# Patient Record
Sex: FEMALE | Race: WHITE | Employment: UNEMPLOYED | URBAN - METROPOLITAN AREA
[De-identification: names, ages, dates, MRNs, and addresses within clinical notes are randomized per-mention and may not be internally consistent; named-entity substitution may affect disease eponyms.]

---

## 2017-01-26 ENCOUNTER — ED HISTORICAL/CONVERTED ENCOUNTER (OUTPATIENT)
Dept: OTHER | Age: 27
End: 2017-01-26

## 2017-06-05 ENCOUNTER — ED HISTORICAL/CONVERTED ENCOUNTER (OUTPATIENT)
Dept: OTHER | Age: 27
End: 2017-06-05

## 2017-09-22 ENCOUNTER — OP HISTORICAL/CONVERTED ENCOUNTER (OUTPATIENT)
Dept: OTHER | Age: 27
End: 2017-09-22

## 2019-01-14 ENCOUNTER — ED HISTORICAL/CONVERTED ENCOUNTER (OUTPATIENT)
Dept: OTHER | Age: 29
End: 2019-01-14

## 2019-04-06 ENCOUNTER — ED HISTORICAL/CONVERTED ENCOUNTER (OUTPATIENT)
Dept: OTHER | Age: 29
End: 2019-04-06

## 2019-05-26 ENCOUNTER — ED HISTORICAL/CONVERTED ENCOUNTER (OUTPATIENT)
Dept: OTHER | Age: 29
End: 2019-05-26

## 2019-05-29 ENCOUNTER — OFFICE VISIT (OUTPATIENT)
Dept: SURGERY | Age: 29
End: 2019-05-29

## 2019-05-29 VITALS
BODY MASS INDEX: 23.64 KG/M2 | HEART RATE: 90 BPM | SYSTOLIC BLOOD PRESSURE: 106 MMHG | DIASTOLIC BLOOD PRESSURE: 67 MMHG | WEIGHT: 156 LBS | HEIGHT: 68 IN

## 2019-05-29 DIAGNOSIS — Z91.89 AT HIGH RISK FOR BREAST CANCER: ICD-10-CM

## 2019-05-29 DIAGNOSIS — N64.52 BILATERAL NIPPLE DISCHARGE: Primary | ICD-10-CM

## 2019-05-29 RX ORDER — ALPRAZOLAM 1 MG/1
TABLET ORAL
Refills: 1 | COMMUNITY
Start: 2019-05-02

## 2019-05-29 NOTE — LETTER
2019 10:26 AM 
 
Patient:  Paresh Nunez YOB: 1990 Date of Visit: 2019 Dear Dr. Anjali Adams: Thank you for referring Ms. Paresh Nunez to me for evaluation/treatment. Below are the relevant portions of my assessment and plan of care. HISTORY OF PRESENT ILLNESS Paresh Nunez is a 34 y.o. female. HPI 
NEW patient presents for consultation at the request of Dr. Herman Nieves for BILATERAL nipple discharge which is white or yellowish in color and has increased in amount since November, when she first noticed this. Initially she had just drops coming from both nipples, but now when she gets in the shower has drops pouring from the nipples. She also notices discharge when she has chills. If she presses on her breasts, the discharge will squirt out. Also has breast tenderness BILATERALLY, but this is not new. Has no nipple retraction or skin change. She does have nipple piercings BILATERALLY, but she has had these for 9 years, only removed when she was breastfeeding. Pt states discharge began before she started taking new medications. FH is significant for a paternal grandmother who had breast cancer initially in her 25s and then again in her 46s and  of this. A paternal aunt had breast cancer in her 45s. Per patient, had a normal mammogram in 2018.   
  
 
Past Medical History:  
Diagnosis Date  Bipolar disorder ([facility] [address]  Depression  PTSD (post-traumatic stress disorder) Past Surgical History:  
Procedure Laterality Date  HX PELVIC LAPAROSCOPY Endometriosis  HX TONSILLECTOMY Social History Socioeconomic History  Marital status: SINGLE Spouse name: Not on file  Number of children: Not on file  Years of education: Not on file  Highest education level: Not on file Occupational History  Not on file Social Needs  Financial resource strain: Not on file  Food insecurity: Worry: Not on file Inability: Not on file  Transportation needs:  
  Medical: Not on file Non-medical: Not on file Tobacco Use  Smoking status: Current Every Day Smoker Packs/day: 1.00 Types: Cigarettes  Smokeless tobacco: Never Used Substance and Sexual Activity  Alcohol use: Not Currently  Drug use: Not on file  Sexual activity: Not on file Lifestyle  Physical activity:  
  Days per week: Not on file Minutes per session: Not on file  Stress: Not on file Relationships  Social connections:  
  Talks on phone: Not on file Gets together: Not on file Attends Shinto service: Not on file Active member of club or organization: Not on file Attends meetings of clubs or organizations: Not on file Relationship status: Not on file  Intimate partner violence:  
  Fear of current or ex partner: Not on file Emotionally abused: Not on file Physically abused: Not on file Forced sexual activity: Not on file Other Topics Concern  Not on file Social History Narrative  Not on file Current Outpatient Medications on File Prior to Visit Medication Sig Dispense Refill  ALPRAZolam (XANAX) 1 mg tablet TK 1 T PO  TID PRN  1 No current facility-administered medications on file prior to visit. Allergies Allergen Reactions  Latex Hives  Codeine Rash  Demerol [Meperidine] Itching  Penicillins Hives OB History None Obstetric Comments Menarche 8, LMP 5/15/19, # of children 1, age of 4st delivery 25, Hysterectomy/oophorectomy No/No/, Breast bx No, history of breast feeding Yes, BCP No, Nuva Ring, Mirena, Hormone therapy No 
  
  
  
 
 
ROS Constitutional: Negative. HENT: Negative. Eyes: Negative. Respiratory: Negative. Cardiovascular: Negative. Gastrointestinal: Negative. Genitourinary: Negative. Musculoskeletal: Negative. Skin: Negative. Neurological: Negative. Endo/Heme/Allergies: Negative. Psychiatric/Behavioral: Positive for depression. Physical Exam  
Cardiovascular: Normal rate, regular rhythm and normal heart sounds. Pulmonary/Chest: Breath sounds normal. Right breast exhibits nipple discharge. Right breast exhibits no inverted nipple, no mass, no skin change and no tenderness. Left breast exhibits nipple discharge. Left breast exhibits no inverted nipple, no mass, no skin change and no tenderness. Breasts are symmetrical.  
Lymphadenopathy:  
     Right cervical: No superficial cervical, no deep cervical and no posterior cervical adenopathy present. Left cervical: No superficial cervical, no deep cervical and no posterior cervical adenopathy present. She has no axillary adenopathy. Right axillary: No pectoral and no lateral adenopathy present. Left axillary: No pectoral and no lateral adenopathy present. ASSESSMENT and PLAN 
  ICD-10-CM ICD-9-CM 1. Bilateral nipple discharge N64.52 611.79 PROLACTIN New patient presents for evaluation of BL breast pain and lactation, and is doing well overall. BL multiorificial physiologic discharge. Ordered testing for prolactin levels. Advised pt to avoid checking for discharge via expression. Possible that nipple piercings may be perpetuating discharge even when pt is not actively checking for it. Discussed family history. Using the [address], calculated pt's lifetime risk at 23.6% for breast cancer. This qualifies her for enrollment in our high risk clinic that includes annual screening breast imaging and follow-up appointments with our nurse practitioner. Will f/u with prolactin levels once results are available. If normal, will order breast MRI for further evaluation. This plan was reviewed with the patient and patient agrees. All questions were answered.  
 
Written by Mike Duran, as dictated by Dr. Claudeen Kanner, MD. 
 
 
 If you have questions, please do not hesitate to call me. I look forward to following Ms. Andry Wylie along with you.  
 
 
 
Sincerely, 
 
 
Marjorie Mazariegos MD

## 2019-05-29 NOTE — PROGRESS NOTES
HISTORY OF PRESENT ILLNESS Filiberto Pastrana is a 34 y.o. female. HPI   NEW patient presents for consultation at the request of Dr. Josse Maldonado for BILATERAL nipple discharge which is white in color and has increased in amount since November, when she first noticed this. Initially she had just drops coming from both nipples, but now when she gets in the shower has drops pouring from the nipples. If she presses on her breasts, the discharge will squirt out. Also has breast tenderness BILATERALLY, but this is not new. Has no nipple retraction or skin change. She does have nipple piercings BILATERALLY, but she has had these for 9 years, only removed when she was breastfeeding. FH is significant for a paternal grandmother who had breast cancer initially in her 25s and then again in her 46s and  of this. A paternal aunt had breast cancer in her 45s. Per patient, had a normal mammogram in 2018. Review of Systems Constitutional: Negative. HENT: Negative. Eyes: Negative. Respiratory: Negative. Cardiovascular: Negative. Gastrointestinal: Negative. Genitourinary: Negative. Musculoskeletal: Negative. Skin: Negative. Neurological: Negative. Endo/Heme/Allergies: Negative. Psychiatric/Behavioral: Positive for depression. Physical Exam 
 
ASSESSMENT and PLAN 
{ASSESSMENT/PLAN:[id number]}

## 2019-05-29 NOTE — PATIENT INSTRUCTIONS

## 2019-05-29 NOTE — PROGRESS NOTES
HISTORY OF PRESENT ILLNESS  Marlee Wiseman is a 34 y.o. female. HPI  NEW patient presents for consultation at the request of Dr. fEren Rodriguez for BILATERAL nipple discharge which is white or yellowish in color and has increased in amount since November, when she first noticed this. Initially she had just drops coming from both nipples, but now when she gets in the shower has drops pouring from the nipples. She also notices discharge when she has chills. If she presses on her breasts, the discharge will squirt out. Also has breast tenderness BILATERALLY, but this is not new. Has no nipple retraction or skin change. She does have nipple piercings BILATERALLY, but she has had these for 9 years, only removed when she was breastfeeding. Pt states discharge began before she started taking new medications. FH is significant for a paternal grandmother who had breast cancer initially in her 25s and then again in her 46s and  of this. A paternal aunt had breast cancer in her 45s.      Per patient, had a normal mammogram in 2018.         Past Medical History:   Diagnosis Date    Bipolar disorder ([address]     Depression     PTSD (post-traumatic stress disorder)        Past Surgical History:   Procedure Laterality Date    HX PELVIC LAPAROSCOPY      Endometriosis    HX TONSILLECTOMY         Social History     Socioeconomic History    Marital status: SINGLE     Spouse name: Not on file    Number of children: Not on file    Years of education: Not on file    Highest education level: Not on file   Occupational History    Not on file   Social Needs    Financial resource strain: Not on file    Food insecurity:     Worry: Not on file     Inability: Not on file    Transportation needs:     Medical: Not on file     Non-medical: Not on file   Tobacco Use    Smoking status: Current Every Day Smoker     Packs/day: 1.00     Types: Cigarettes    Smokeless tobacco: Never Used   Substance and Sexual Activity    Alcohol use: Not Currently    Drug use: Not on file    Sexual activity: Not on file   Lifestyle    Physical activity:     Days per week: Not on file     Minutes per session: Not on file    Stress: Not on file   Relationships    Social connections:     Talks on phone: Not on file     Gets together: Not on file     Attends [employer] service: Not on file     Active member of club or organization: Not on file     Attends meetings of clubs or organizations: Not on file     Relationship status: Not on file    Intimate partner violence:     Fear of current or ex partner: Not on file     Emotionally abused: Not on file     Physically abused: Not on file     Forced sexual activity: Not on file   Other Topics Concern    Not on file   Social History Narrative    Not on file       Current Outpatient Medications on File Prior to Visit   Medication Sig Dispense Refill    ALPRAZolam (XANAX) 1 mg tablet TK 1 T PO  TID PRN  1     No current facility-administered medications on file prior to visit. Allergies   Allergen Reactions    Latex Hives    Codeine Rash    Demerol [Meperidine] Itching    Penicillins Hives       OB History    None      Obstetric Comments   Menarche 8, LMP 5/15/19, # of children 1, age of 6st delivery 25, Hysterectomy/oophorectomy No/No/, Breast bx No, history of breast feeding Yes, BCP No, Nuva Ring, Mirena, Hormone therapy No               ROS  Constitutional: Negative. HENT: Negative. Eyes: Negative. Respiratory: Negative. Cardiovascular: Negative. Gastrointestinal: Negative. Genitourinary: Negative. Musculoskeletal: Negative. Skin: Negative. Neurological: Negative. Endo/Heme/Allergies: Negative. Psychiatric/Behavioral: Positive for depression. Physical Exam   Cardiovascular: Normal rate, regular rhythm and normal heart sounds. Pulmonary/Chest: Breath sounds normal. Right breast exhibits nipple discharge.  Right breast exhibits no inverted nipple, no mass, no skin change and no tenderness. Left breast exhibits nipple discharge. Left breast exhibits no inverted nipple, no mass, no skin change and no tenderness. Breasts are symmetrical.   Lymphadenopathy:        Right cervical: No superficial cervical, no deep cervical and no posterior cervical adenopathy present. Left cervical: No superficial cervical, no deep cervical and no posterior cervical adenopathy present. She has no axillary adenopathy. Right axillary: No pectoral and no lateral adenopathy present. Left axillary: No pectoral and no lateral adenopathy present. ASSESSMENT and PLAN    ICD-10-CM ICD-9-CM    1. Bilateral nipple discharge N64.52 611.79 PROLACTIN      New patient presents for evaluation of BL breast pain and lactation, and is doing well overall. BL multiorificial physiologic discharge. Ordered testing for prolactin levels. Advised pt to avoid checking for discharge via expression. Possible that nipple piercings may be perpetuating discharge even when pt is not actively checking for it. Discussed family history. Using the [address], calculated pt's lifetime risk at 23.6% for breast cancer. This qualifies her for enrollment in our high risk clinic that includes annual screening breast imaging and follow-up appointments with our nurse practitioner. Will f/u with prolactin levels once results are available. If normal, will order breast MRI for further evaluation. This plan was reviewed with the patient and patient agrees. All questions were answered.     Written by Benito Arroyo, as dictated by Dr. Suresh Vaughn MD.

## 2019-05-30 ENCOUNTER — TELEPHONE (OUTPATIENT)
Dept: SURGERY | Age: 29
End: 2019-05-30

## 2019-05-30 LAB — PROLACTIN SERPL-MCNC: 17.4 NG/ML (ref 4.8–23.3)

## 2019-05-30 NOTE — COMMUNICATION BODY
HISTORY OF PRESENT ILLNESS  Jasmin Sorenson is a 34 y.o. female. HPI  NEW patient presents for consultation at the request of Dr. Ronni Barakat for BILATERAL nipple discharge which is white or yellowish in color and has increased in amount since November, when she first noticed this. Initially she had just drops coming from both nipples, but now when she gets in the shower has drops pouring from the nipples. She also notices discharge when she has chills. If she presses on her breasts, the discharge will squirt out. Also has breast tenderness BILATERALLY, but this is not new. Has no nipple retraction or skin change. She does have nipple piercings BILATERALLY, but she has had these for 9 years, only removed when she was breastfeeding. Pt states discharge began before she started taking new medications. FH is significant for a paternal grandmother who had breast cancer initially in her 25s and then again in her 46s and  of this. A paternal aunt had breast cancer in her 45s.      Per patient, had a normal mammogram in 2018.         Past Medical History:   Diagnosis Date    Bipolar disorder ([address]     Depression     PTSD (post-traumatic stress disorder)        Past Surgical History:   Procedure Laterality Date    HX PELVIC LAPAROSCOPY      Endometriosis    HX TONSILLECTOMY         Social History     Socioeconomic History    Marital status: SINGLE     Spouse name: Not on file    Number of children: Not on file    Years of education: Not on file    Highest education level: Not on file   Occupational History    Not on file   Social Needs    Financial resource strain: Not on file    Food insecurity:     Worry: Not on file     Inability: Not on file    Transportation needs:     Medical: Not on file     Non-medical: Not on file   Tobacco Use    Smoking status: Current Every Day Smoker     Packs/day: 1.00     Types: Cigarettes    Smokeless tobacco: Never Used   Substance and Sexual Activity    Alcohol use: Not Currently    Drug use: Not on file    Sexual activity: Not on file   Lifestyle    Physical activity:     Days per week: Not on file     Minutes per session: Not on file    Stress: Not on file   Relationships    Social connections:     Talks on phone: Not on file     Gets together: Not on file     Attends Anabaptism service: Not on file     Active member of club or organization: Not on file     Attends meetings of clubs or organizations: Not on file     Relationship status: Not on file    Intimate partner violence:     Fear of current or ex partner: Not on file     Emotionally abused: Not on file     Physically abused: Not on file     Forced sexual activity: Not on file   Other Topics Concern    Not on file   Social History Narrative    Not on file       Current Outpatient Medications on File Prior to Visit   Medication Sig Dispense Refill    ALPRAZolam (XANAX) 1 mg tablet TK 1 T PO  TID PRN  1     No current facility-administered medications on file prior to visit. Allergies   Allergen Reactions    Latex Hives    Codeine Rash    Demerol [Meperidine] Itching    Penicillins Hives       OB History    None      Obstetric Comments   Menarche 8, LMP 5/15/19, # of children 1, age of 6st delivery 25, Hysterectomy/oophorectomy No/No/, Breast bx No, history of breast feeding Yes, BCP No, Nuva Ring, Mirena, Hormone therapy No               ROS  Constitutional: Negative. HENT: Negative. Eyes: Negative. Respiratory: Negative. Cardiovascular: Negative. Gastrointestinal: Negative. Genitourinary: Negative. Musculoskeletal: Negative. Skin: Negative. Neurological: Negative. Endo/Heme/Allergies: Negative. Psychiatric/Behavioral: Positive for depression. Physical Exam   Cardiovascular: Normal rate, regular rhythm and normal heart sounds. Pulmonary/Chest: Breath sounds normal. Right breast exhibits nipple discharge.  Right breast exhibits no inverted nipple, no mass, no skin change and no tenderness. Left breast exhibits nipple discharge. Left breast exhibits no inverted nipple, no mass, no skin change and no tenderness. Breasts are symmetrical.   Lymphadenopathy:        Right cervical: No superficial cervical, no deep cervical and no posterior cervical adenopathy present. Left cervical: No superficial cervical, no deep cervical and no posterior cervical adenopathy present. She has no axillary adenopathy. Right axillary: No pectoral and no lateral adenopathy present. Left axillary: No pectoral and no lateral adenopathy present. ASSESSMENT and PLAN    ICD-10-CM ICD-9-CM    1. Bilateral nipple discharge N64.52 611.79 PROLACTIN      New patient presents for evaluation of BL breast pain and lactation, and is doing well overall. BL multiorificial physiologic discharge. Ordered testing for prolactin levels. Advised pt to avoid checking for discharge via expression. Possible that nipple piercings may be perpetuating discharge even when pt is not actively checking for it. Discussed family history. Using the [address], calculated pt's lifetime risk at 23.6% for breast cancer. This qualifies her for enrollment in our high risk clinic that includes annual screening breast imaging and follow-up appointments with our nurse practitioner. Will f/u with prolactin levels once results are available. If normal, will order breast MRI for further evaluation. This plan was reviewed with the patient and patient agrees. All questions were answered.     Written by Lety Duran, as dictated by Dr. Madison Cardona MD.

## 2019-09-20 ENCOUNTER — DOCUMENTATION ONLY (OUTPATIENT)
Dept: SURGERY | Age: 29
End: 2019-09-20

## 2019-10-05 ENCOUNTER — ED HISTORICAL/CONVERTED ENCOUNTER (OUTPATIENT)
Dept: OTHER | Age: 29
End: 2019-10-05

## 2020-03-10 ENCOUNTER — ED HISTORICAL/CONVERTED ENCOUNTER (OUTPATIENT)
Dept: OTHER | Age: 30
End: 2020-03-10

## 2020-05-22 ENCOUNTER — HOSPITAL ENCOUNTER (OUTPATIENT)
Dept: MRI IMAGING | Age: 30
Discharge: HOME | End: 2020-05-22
Attending: FAMILY MEDICINE
Payer: MEDICAID

## 2020-05-22 DIAGNOSIS — M23.304 MENISCUS, MEDIAL, DERANGEMENT, LEFT: ICD-10-CM

## 2020-05-22 DIAGNOSIS — S83.412A SPRAIN OF MEDIAL COLLATERAL LIGAMENT OF LEFT KNEE: ICD-10-CM

## 2020-05-22 PROCEDURE — 73721 MRI JNT OF LWR EXTRE W/O DYE: CPT

## 2020-11-04 ENCOUNTER — APPOINTMENT (OUTPATIENT)
Dept: CT IMAGING | Age: 30
End: 2020-11-04
Attending: EMERGENCY MEDICINE
Payer: MEDICAID

## 2020-11-04 ENCOUNTER — APPOINTMENT (OUTPATIENT)
Dept: GENERAL RADIOLOGY | Age: 30
End: 2020-11-04
Attending: EMERGENCY MEDICINE
Payer: MEDICAID

## 2020-11-04 PROCEDURE — 70450 CT HEAD/BRAIN W/O DYE: CPT

## 2020-11-04 PROCEDURE — 99283 EMERGENCY DEPT VISIT LOW MDM: CPT

## 2020-11-04 PROCEDURE — 85025 COMPLETE CBC W/AUTO DIFF WBC: CPT

## 2020-11-04 PROCEDURE — 84484 ASSAY OF TROPONIN QUANT: CPT

## 2020-11-04 PROCEDURE — 96374 THER/PROPH/DIAG INJ IV PUSH: CPT

## 2020-11-04 PROCEDURE — 71045 X-RAY EXAM CHEST 1 VIEW: CPT

## 2020-11-04 PROCEDURE — 82550 ASSAY OF CK (CPK): CPT

## 2020-11-04 PROCEDURE — 93005 ELECTROCARDIOGRAM TRACING: CPT

## 2020-11-04 PROCEDURE — 96375 TX/PRO/DX INJ NEW DRUG ADDON: CPT

## 2020-11-05 ENCOUNTER — HOSPITAL ENCOUNTER (EMERGENCY)
Age: 30
Discharge: HOME | End: 2020-11-05
Payer: MEDICAID

## 2020-11-05 VITALS
OXYGEN SATURATION: 98 % | HEART RATE: 66 BPM | WEIGHT: 158 LBS | HEIGHT: 68 IN | RESPIRATION RATE: 16 BRPM | TEMPERATURE: 97.9 F | BODY MASS INDEX: 23.95 KG/M2 | SYSTOLIC BLOOD PRESSURE: 100 MMHG | DIASTOLIC BLOOD PRESSURE: 64 MMHG

## 2020-11-05 DIAGNOSIS — R42 DIZZINESS: Primary | ICD-10-CM

## 2020-11-05 DIAGNOSIS — G44.221 CHRONIC TENSION-TYPE HEADACHE, INTRACTABLE: ICD-10-CM

## 2020-11-05 LAB
ALBUMIN SERPL-MCNC: 3.7 G/DL (ref 3.5–5)
ALBUMIN/GLOB SERPL: 1 {RATIO} (ref 1.1–2.2)
ALP SERPL-CCNC: 57 U/L (ref 45–117)
ALT SERPL-CCNC: 15 U/L (ref 12–78)
ALT SERPL-CCNC: ABNORMAL U/L (ref 12–78)
ANION GAP SERPL CALC-SCNC: 1 MMOL/L (ref 5–15)
AST SERPL W P-5'-P-CCNC: 9 U/L (ref 15–37)
AST SERPL W P-5'-P-CCNC: ABNORMAL U/L (ref 15–37)
ATRIAL RATE: 80 BPM
BASOPHILS # BLD: 0 K/UL (ref 0–0.1)
BASOPHILS NFR BLD: 0 % (ref 0–1)
BILIRUB SERPL-MCNC: 0.3 MG/DL (ref 0.2–1)
BUN SERPL-MCNC: 13 MG/DL (ref 6–20)
BUN/CREAT SERPL: 14 (ref 12–20)
CA-I BLD-MCNC: 8.5 MG/DL (ref 8.5–10.1)
CALCULATED P AXIS, ECG09: 81 DEGREES
CALCULATED R AXIS, ECG10: 76 DEGREES
CALCULATED T AXIS, ECG11: 55 DEGREES
CHLORIDE SERPL-SCNC: 108 MMOL/L (ref 97–108)
CK SERPL-CCNC: 80 NG/ML (ref 26–192)
CO2 SERPL-SCNC: 28 MMOL/L (ref 21–32)
CREAT SERPL-MCNC: 0.93 MG/DL (ref 0.55–1.02)
DIAGNOSIS, 93000: NORMAL
DIFFERENTIAL METHOD BLD: NORMAL
EOSINOPHIL # BLD: 0.3 K/UL (ref 0–0.4)
EOSINOPHIL NFR BLD: 4 % (ref 0–7)
ERYTHROCYTE [DISTWIDTH] IN BLOOD BY AUTOMATED COUNT: 14 % (ref 11.5–14.5)
GLOBULIN SER CALC-MCNC: 3.8 G/DL (ref 2–4)
GLUCOSE SERPL-MCNC: 76 MG/DL (ref 65–100)
HCT VFR BLD AUTO: 41.9 % (ref 35–47)
HGB BLD-MCNC: 13.6 G/DL (ref 11.5–16)
IMM GRANULOCYTES # BLD AUTO: 0 K/UL (ref 0–0.04)
IMM GRANULOCYTES NFR BLD AUTO: 0 % (ref 0–0.5)
LYMPHOCYTES # BLD: 2.6 K/UL (ref 0.8–3.5)
LYMPHOCYTES NFR BLD: 38 % (ref 12–49)
MCH RBC QN AUTO: 29 PG (ref 26–34)
MCHC RBC AUTO-ENTMCNC: 32.5 G/DL (ref 30–36.5)
MCV RBC AUTO: 89.3 FL (ref 80–99)
MONOCYTES # BLD: 0.6 K/UL (ref 0–1)
MONOCYTES NFR BLD: 9 % (ref 5–13)
NEUTS SEG # BLD: 3.4 K/UL (ref 1.8–8)
NEUTS SEG NFR BLD: 49 % (ref 32–75)
P-R INTERVAL, ECG05: 138 MS
PLATELET # BLD AUTO: 226 K/UL (ref 150–400)
PMV BLD AUTO: 11.5 FL (ref 8.9–12.9)
POTASSIUM SERPL-SCNC: 3.6 MMOL/L (ref 3.5–5.1)
POTASSIUM SERPL-SCNC: ABNORMAL MMOL/L (ref 3.5–5.1)
PROT SERPL-MCNC: 7.5 G/DL (ref 6.4–8.2)
Q-T INTERVAL, ECG07: 396 MS
QRS DURATION, ECG06: 82 MS
QTC CALCULATION (BEZET), ECG08: 456 MS
RBC # BLD AUTO: 4.69 M/UL (ref 3.8–5.2)
SODIUM SERPL-SCNC: 137 MMOL/L (ref 136–145)
TROPONIN I SERPL-MCNC: <0.05 NG/ML
VENTRICULAR RATE, ECG03: 80 BPM
WBC # BLD AUTO: 6.9 K/UL (ref 3.6–11)

## 2020-11-05 PROCEDURE — 36415 COLL VENOUS BLD VENIPUNCTURE: CPT

## 2020-11-05 PROCEDURE — 96375 TX/PRO/DX INJ NEW DRUG ADDON: CPT

## 2020-11-05 PROCEDURE — 96374 THER/PROPH/DIAG INJ IV PUSH: CPT

## 2020-11-05 PROCEDURE — 84450 TRANSFERASE (AST) (SGOT): CPT

## 2020-11-05 PROCEDURE — 84460 ALANINE AMINO (ALT) (SGPT): CPT

## 2020-11-05 PROCEDURE — 74011250636 HC RX REV CODE- 250/636: Performed by: NURSE PRACTITIONER

## 2020-11-05 PROCEDURE — 84132 ASSAY OF SERUM POTASSIUM: CPT

## 2020-11-05 RX ORDER — BUTALBITAL, ACETAMINOPHEN AND CAFFEINE 300; 40; 50 MG/1; MG/1; MG/1
1 CAPSULE ORAL
Qty: 10 CAP | Refills: 0 | Status: SHIPPED | OUTPATIENT
Start: 2020-11-05

## 2020-11-05 RX ORDER — PROCHLORPERAZINE EDISYLATE 5 MG/ML
10 INJECTION INTRAMUSCULAR; INTRAVENOUS
Status: COMPLETED | OUTPATIENT
Start: 2020-11-05 | End: 2020-11-05

## 2020-11-05 RX ORDER — DIPHENHYDRAMINE HYDROCHLORIDE 50 MG/ML
25 INJECTION, SOLUTION INTRAMUSCULAR; INTRAVENOUS ONCE
Status: COMPLETED | OUTPATIENT
Start: 2020-11-05 | End: 2020-11-05

## 2020-11-05 RX ADMIN — DIPHENHYDRAMINE HYDROCHLORIDE 25 MG: 50 INJECTION, SOLUTION INTRAMUSCULAR; INTRAVENOUS at 01:08

## 2020-11-05 RX ADMIN — SODIUM CHLORIDE 1000 ML: 9 INJECTION, SOLUTION INTRAVENOUS at 01:07

## 2020-11-05 RX ADMIN — PROCHLORPERAZINE EDISYLATE 10 MG: 5 INJECTION INTRAMUSCULAR; INTRAVENOUS at 01:09

## 2020-11-05 NOTE — ED TRIAGE NOTES
States the last 5 months she has had headaches and dizziness. States it is getting worse. Has not seen pcp.  Also has a lump in the right armpit

## 2020-11-05 NOTE — ED PROVIDER NOTES
EMERGENCY DEPARTMENT HISTORY AND PHYSICAL EXAM      Date: 11/5/2020  Patient Name: Pricilla Galarza      History of Presenting Illness     Chief Complaint   Patient presents with    Dizziness    Headache    Fatigue       History Provided By: Patient    HPI: Pricilla Galarza, 31 y.o. female with a past medical history significant environmental allergies presents to the ED with cc of dizziness and headache every day for the last 5 months. She states she has been unable to see her PCP secondary to Covid closure. She reports some associated photophobia and nausea. Symptoms are worse with any movement. She denies any head trauma, vomiting, fever/chills or visual changes. Last vision exam was earlier this year. SHe has tried taking Tylenol, IBU with no relief. She denies any headache or dizziness presently. There are no other complaints, changes, or physical findings at this time. PCP: Lianet Church MD    Current Outpatient Medications   Medication Sig Dispense Refill    butalbital-acetaminophen-caff (FIORICET) -40 mg per capsule Take 1 Cap by mouth every four (4) hours as needed for Headache. 10 Cap 0    ALPRAZolam (XANAX) 1 mg tablet TK 1 T PO  TID PRN  1       Past History     Past Medical History:  Past Medical History:   Diagnosis Date    Bipolar disorder ([address]     Depression     PTSD (post-traumatic stress disorder)        Past Surgical History:  Past Surgical History:   Procedure Laterality Date    HX PELVIC LAPAROSCOPY      Endometriosis    HX TONSILLECTOMY         Family History:  History reviewed. No pertinent family history. Social History:  Social History     Tobacco Use    Smoking status: Current Every Day Smoker     Packs/day: 1.00     Types: Cigarettes    Smokeless tobacco: Never Used   Substance Use Topics    Alcohol use: Not Currently    Drug use: Never       Allergies:   Allergies   Allergen Reactions    Latex Hives    Codeine Rash    Demerol [Meperidine] Itching    Penicillins Hives         Review of Systems     Review of Systems   Constitutional: Negative. Negative for chills, fatigue and fever. Eyes: Positive for photophobia. Negative for visual disturbance. Respiratory: Negative. Negative for cough and shortness of breath. Cardiovascular: Negative. Negative for chest pain and palpitations. Gastrointestinal: Positive for nausea. Negative for abdominal pain, diarrhea and vomiting. Genitourinary: Negative. Negative for dysuria and hematuria. Neurological: Positive for dizziness, light-headedness and headaches. Negative for speech difficulty and numbness. All other systems reviewed and are negative. Physical Exam     Physical Exam  Vitals signs and nursing note reviewed. Constitutional:       General: She is not in acute distress. Appearance: Normal appearance. HENT:      Head: Normocephalic and atraumatic. Right Ear: Tympanic membrane normal.      Left Ear: Tympanic membrane normal.      Nose: Rhinorrhea present. Right Turbinates: Swollen and pale. Left Turbinates: Swollen and pale. Right Sinus: Frontal sinus tenderness present. Left Sinus: Frontal sinus tenderness present. Mouth/Throat:      Lips: Pink. Mouth: Mucous membranes are moist.      Pharynx: Oropharynx is clear. Uvula midline. Tonsils: No tonsillar exudate. Eyes:      Extraocular Movements: Extraocular movements intact. Right eye: No nystagmus. Left eye: No nystagmus. Conjunctiva/sclera: Conjunctivae normal.      Pupils: Pupils are equal, round, and reactive to light. Neck:      Musculoskeletal: Normal range of motion and neck supple. Cardiovascular:      Rate and Rhythm: Normal rate and regular rhythm. Pulses: Normal pulses. Heart sounds: Normal heart sounds. Pulmonary:      Effort: Pulmonary effort is normal.      Breath sounds: Normal breath sounds and air entry. No wheezing or rales.    Abdominal: General: Bowel sounds are normal.      Palpations: Abdomen is soft. Tenderness: There is no abdominal tenderness. Musculoskeletal: Normal range of motion. Skin:     General: Skin is warm and dry. Neurological:      General: No focal deficit present. Mental Status: She is alert. GCS: GCS eye subscore is 4. GCS verbal subscore is 5. GCS motor subscore is 6. Cranial Nerves: Cranial nerves are intact. Sensory: Sensation is intact. Motor: Motor function is intact. Coordination: Coordination is intact. Gait: Gait is intact. Psychiatric:         Mood and Affect: Mood normal.         Behavior: Behavior normal. Behavior is cooperative. Lab and Diagnostic Study Results     Labs -  Admission on 11/05/2020, Discharged on 11/05/2020   Component Date Value    Ventricular Rate 11/04/2020 80     Atrial Rate 11/04/2020 80     P-R Interval 11/04/2020 138     QRS Duration 11/04/2020 82     Q-T Interval 11/04/2020 396     QTC Calculation (Bezet) 11/04/2020 456     Calculated P Axis 11/04/2020 81     Calculated R Axis 11/04/2020 76     Calculated T Axis 11/04/2020 55     Diagnosis 11/04/2020                      Value:Normal sinus rhythm  Normal ECG  When compared with ECG of 05-OCT-2019 00:08,  No significant change was found  Confirmed by JAGDEEP MCKEON, Evelin [facility] ([id number]) on 11/5/2020 7:49:11 AM      WBC 11/04/2020 6.9     RBC 11/04/2020 4.69     HGB 11/04/2020 13.6     HCT 11/04/2020 41.9     MCV 11/04/2020 89.3     MCH 11/04/2020 29.0     MCHC 11/04/2020 32.5     RDW 11/04/2020 14.0     PLATELET 27/96/5408 396     MPV 11/04/2020 11.5     NEUTROPHILS 11/04/2020 49     LYMPHOCYTES 11/04/2020 38     MONOCYTES 11/04/2020 9     EOSINOPHILS 11/04/2020 4     BASOPHILS 11/04/2020 0     IMMATURE GRANULOCYTES 11/04/2020 0     ABS. NEUTROPHILS 11/04/2020 3.4     ABS. LYMPHOCYTES 11/04/2020 2.6     ABS. MONOCYTES 11/04/2020 0.6     ABS.  EOSINOPHILS 11/04/2020 0.3     ABS. BASOPHILS 11/04/2020 0.0     ABS. IMM. GRANS. 11/04/2020 0.0     DF 11/04/2020 AUTOMATED     Sodium 11/04/2020 137     Potassium 11/04/2020 Hemolyzed, recollect requested     Chloride 11/04/2020 108     CO2 11/04/2020 28     Anion gap 11/04/2020 1*    Glucose 11/04/2020 76     BUN 11/04/2020 13     Creatinine 11/04/2020 0.93     BUN/Creatinine ratio 11/04/2020 14     GFR est AA 11/04/2020 >60     GFR est non-AA 11/04/2020 >60     Calcium 11/04/2020 8.5     Bilirubin, total 11/04/2020 0.3     AST (SGOT) 11/04/2020 Hemolyzed, recollect requested     ALT (SGPT) 11/04/2020 Hemolyzed, recollect requested     Alk. phosphatase 11/04/2020 57     Protein, total 11/04/2020 7.5     Albumin 11/04/2020 3.7     Globulin 11/04/2020 3.8     A-G Ratio 11/04/2020 1.0*    CK 11/04/2020 80.0     Troponin-I, Qt. 11/04/2020 <0.05     AST (SGOT) 11/05/2020 9*    ALT (SGPT) 11/05/2020 15     Potassium 11/05/2020 3.6      Radiologic Studies -   [unfilled]  CT Results  (Last 48 hours)               11/04/20 2330  CT HEAD WO CONT Final result    Impression:  Impression:   No acute intracranial abnormality. Narrative:  Study: Head CT without contrast.       Clinical indication: Dizziness       Technique: Routine volume acquisition of the head was obtained without IV   contrast. Coronal and sagittal reformations were generated in soft tissue and   bone kernels. Dose reduction: All CT scans at this facility are performed using   dose reduction optimization techniques as appropriate to a performed exam   including the following: Automated exposure control, adjustments of the mA   and/or kV according to patient size, or use of iterative reconstruction   technique. Comparison: Head CT 1/25/2012. Findings:        No evidence of acute intracranial hemorrhage, mass effect, midline shift or   abnormal extra-axial fluid collection. Ventricles and basal cisterns are   preserved and are symmetric. Gray-white matter differentiation is maintained. No evidence of skull fracture. Visualized paranasal sinuses and mastoid air   cells are clear. Visualized globes and orbits are intact. CXR Results  (Last 48 hours)               11/04/20 2350  XR CHEST PORT Final result    Impression:  Impression:   No acute findings       Narrative:  Study: XR CHEST PORT       Clinical indication: chest pain       Comparison: Chest x-ray 10/5/2019. Findings:       No consolidative airspace disease, pleural effusion or pneumothorax. Cardiomediastinal contours are within normal limits. No pulmonary edema. No acute osseous abnormality identified. Medical Decision Making and ED Course   - I am the first and primary provider for this patient. - I reviewed the vital signs, available nursing notes, past medical history, past surgical history, family history and social history. - Initial assessment performed. The patients presenting problems have been discussed, and the staff are in agreement with the care plan formulated and outlined with them. I have encouraged them to ask questions as they arise throughout their visit. Vital Signs-Reviewed the patient's vital signs. No data found. EKG interpretation: (Preliminary): Performed at 2308, and read at 2310  Rhythm: normal sinus rhythm; and regular . Rate (approx.): 80; Axis: normal; IN interval: normal; QRS interval: normal ; ST/T wave: normal; Other findings: normal.      Records Reviewed: Nursing Notes    The patient presents with dizziness with a differential diagnosis of  cardiac dysrhythm, dizziness/vertigo, generalized weakness, hypertension, labrynthitis and TIA      Provider Notes (Medical Decision Making):   Labs/CT benign, vital signs normal, EKG normal, neuro exam benign; reinforced importance of hydration, regular sleep, decrease caffeine intake for chronic HA management.   Will discharge with neurology follow-up. Discussed signs and symptoms and worrisome reasons to return to the department. MDM           Disposition     Disposition: Condition stable  DC- Adult Discharges: All of the diagnostic tests were reviewed and questions answered. Diagnosis, care plan and treatment options were discussed. The patient understands the instructions and will follow up as directed. The patients results have been reviewed with them. They have been counseled regarding their diagnosis. The patient verbally convey understanding and agreement of the signs, symptoms, diagnosis, treatment and prognosis and additionally agrees to follow up as recommended. They also agree with the care-plan and convey that all of their questions have been answered. I have also put together some discharge instructions for them that include: 1) educational information regarding their diagnosis, 2) how to care for their diagnosis at home, as well a 3) list of reasons why they would want to return to the ED prior to their follow-up appointment, should their condition change. DISCHARGE PLAN:  1. Current Discharge Medication List      CONTINUE these medications which have NOT CHANGED    Details   ALPRAZolam (XANAX) 1 mg tablet TK 1 T PO  TID PRN  Refills: 1           2. Follow-up Information     Follow up With Specialties Details Why [facility]    [facility] EMERGENCY DEPT Emergency Medicine  If symptoms worsen [address] [facility]    Ziggy Burnett MD Neurology  neurology for continued symptoms [facility] [address] [facility]  [phone]          3. Return to ED if worse   4.    Discharge Medication List as of 11/5/2020  5:13 AM      START taking these medications    Details   butalbital-acetaminophen-caff (FIORICET) -40 mg per capsule Take 1 Cap by mouth every four (4) hours as needed for Headache., Normal, Disp-10 Cap,R-0         CONTINUE these medications which have NOT CHANGED    Details   ALPRAZolam (XANAX) 1 mg tablet TK 1 T PO  TID PRN, Historical Med, R-1             Diagnosis     Clinical Impression:   1. Dizziness    2. Chronic tension-type headache, intractable        Attestations:    Kristen Cox MD    Please note that this dictation was completed with [employer], the computer voice recognition software. Quite often unanticipated grammatical, syntax, homophones, and other interpretive errors are inadvertently transcribed by the computer software. Please disregard these errors. Please excuse any errors that have escaped final proofreading. Thank you.

## 2021-07-17 ENCOUNTER — HOSPITAL ENCOUNTER (EMERGENCY)
Age: 31
Discharge: HOME | End: 2021-07-17
Attending: EMERGENCY MEDICINE
Payer: MEDICAID

## 2021-07-17 VITALS
WEIGHT: 158 LBS | HEART RATE: 70 BPM | BODY MASS INDEX: 24.8 KG/M2 | TEMPERATURE: 98.3 F | SYSTOLIC BLOOD PRESSURE: 126 MMHG | OXYGEN SATURATION: 100 % | DIASTOLIC BLOOD PRESSURE: 82 MMHG | HEIGHT: 67 IN | RESPIRATION RATE: 18 BRPM

## 2021-07-17 DIAGNOSIS — N39.0 URINARY TRACT INFECTION WITHOUT HEMATURIA, SITE UNSPECIFIED: Primary | ICD-10-CM

## 2021-07-17 LAB
APPEARANCE UR: ABNORMAL
BACTERIA URNS QL MICRO: ABNORMAL /HPF
BILIRUB UR QL: NEGATIVE
COLOR UR: YELLOW
EPITH CASTS URNS QL MICRO: ABNORMAL /LPF
GLUCOSE UR STRIP.AUTO-MCNC: NEGATIVE MG/DL
HCG UR QL: NEGATIVE
HGB UR QL STRIP: ABNORMAL
KETONES UR QL STRIP.AUTO: NEGATIVE MG/DL
LEUKOCYTE ESTERASE UR QL STRIP.AUTO: ABNORMAL
NITRITE UR QL STRIP.AUTO: NEGATIVE
PH UR STRIP: 6.5 [PH] (ref 5–8)
PROT UR STRIP-MCNC: 30 MG/DL
RBC #/AREA URNS HPF: ABNORMAL /HPF (ref 0–5)
SP GR UR REFRACTOMETRY: 1.02 (ref 1–1.03)
UROBILINOGEN UR QL STRIP.AUTO: 0.1 EU/DL (ref 0.2–1)
WBC URNS QL MICRO: ABNORMAL /HPF (ref 0–4)

## 2021-07-17 PROCEDURE — 81025 URINE PREGNANCY TEST: CPT

## 2021-07-17 PROCEDURE — 81001 URINALYSIS AUTO W/SCOPE: CPT

## 2021-07-17 PROCEDURE — 99284 EMERGENCY DEPT VISIT MOD MDM: CPT

## 2021-07-17 PROCEDURE — 74011250637 HC RX REV CODE- 250/637: Performed by: EMERGENCY MEDICINE

## 2021-07-17 RX ORDER — IBUPROFEN 800 MG/1
800 TABLET ORAL
Qty: 20 TABLET | Refills: 0 | Status: SHIPPED | OUTPATIENT
Start: 2021-07-17 | End: 2021-07-24

## 2021-07-17 RX ORDER — ACETAMINOPHEN 500 MG
1000 TABLET ORAL ONCE
Status: COMPLETED | OUTPATIENT
Start: 2021-07-17 | End: 2021-07-17

## 2021-07-17 RX ORDER — SULFAMETHOXAZOLE AND TRIMETHOPRIM 800; 160 MG/1; MG/1
1 TABLET ORAL 2 TIMES DAILY
Qty: 14 TABLET | Refills: 0 | Status: SHIPPED | OUTPATIENT
Start: 2021-07-17 | End: 2021-07-24

## 2021-07-17 RX ORDER — SULFAMETHOXAZOLE AND TRIMETHOPRIM 800; 160 MG/1; MG/1
1 TABLET ORAL ONCE
Status: COMPLETED | OUTPATIENT
Start: 2021-07-17 | End: 2021-07-17

## 2021-07-17 RX ORDER — IBUPROFEN 800 MG/1
800 TABLET ORAL ONCE
Status: COMPLETED | OUTPATIENT
Start: 2021-07-17 | End: 2021-07-17

## 2021-07-17 RX ORDER — PHENAZOPYRIDINE HYDROCHLORIDE 95 MG/1
190 TABLET ORAL ONCE
Status: COMPLETED | OUTPATIENT
Start: 2021-07-17 | End: 2021-07-17

## 2021-07-17 RX ADMIN — SULFAMETHOXAZOLE AND TRIMETHOPRIM 1 TABLET: 800; 160 TABLET ORAL at 22:46

## 2021-07-17 RX ADMIN — Medication 190 MG: at 22:46

## 2021-07-17 RX ADMIN — ACETAMINOPHEN 1000 MG: 500 TABLET ORAL at 22:46

## 2021-07-17 RX ADMIN — IBUPROFEN 800 MG: 800 TABLET, FILM COATED ORAL at 22:46

## 2021-07-18 NOTE — ED PROVIDER NOTES
EMERGENCY DEPARTMENT HISTORY AND PHYSICAL EXAM      Date: 7/17/2021  Patient Name: Lawanda Rahman    History of Presenting Illness     Chief Complaint   Patient presents with    Urinary Pain       History Provided By: Patient    HPI: Lawanda Rahman, 31 y.o. female   presents to the ED with cc of UTI. Patient complains of urinary frequency and dysuria for last 2 days. Patient states that the symptom is consistent with her previous episode of UTI. No vaginal discharge. Low back pain. No fever chills. No nausea vomiting. One episode of diarrhea yesterday. PCP: Lianet Church MD    No current facility-administered medications on file prior to encounter. Current Outpatient Medications on File Prior to Encounter   Medication Sig Dispense Refill    butalbital-acetaminophen-caff (FIORICET) -40 mg per capsule Take 1 Cap by mouth every four (4) hours as needed for Headache. 10 Cap 0    ALPRAZolam (XANAX) 1 mg tablet TK 1 T PO  TID PRN  1       Past History     Past Medical History:  Past Medical History:   Diagnosis Date    ADHD     B[address] disorder ([facility] [address]     Depression     PTSD (post-traumatic stress disorder)        Past Surgical History:  Past Surgical History:   Procedure Laterality Date    HX PELVIC LAPAROSCOPY      Endometriosis    HX TONSILLECTOMY         Family History:  History reviewed. No pertinent family history. Social History:  Social History     Tobacco Use    Smoking status: Current Every Day Smoker     Packs/day: 1.00     Types: Cigarettes    Smokeless tobacco: Never Used   Substance Use Topics    Alcohol use: Not Currently    Drug use: Never       Allergies: Allergies   Allergen Reactions    Latex Hives    Codeine Rash    Demerol [Meperidine] Itching    Penicillins Hives         Review of Systems   Review of Systems   Constitutional: Negative for activity change, appetite change, chills and fever. HENT: Negative for sore throat. Eyes: Negative for discharge. Respiratory: Negative for shortness of breath. Cardiovascular: Negative for chest pain. Gastrointestinal: Negative for nausea. Endocrine: Negative for polyuria. Genitourinary: Positive for dysuria and frequency. Negative for difficulty urinating. Musculoskeletal: Negative for arthralgias. Skin: Negative for rash. Neurological: Negative for headaches. Hematological: Negative for adenopathy. Psychiatric/Behavioral: Negative for suicidal ideas. All other systems reviewed and are negative. Physical Exam   Physical Exam  Vitals and nursing note reviewed. Constitutional:       Appearance: Normal appearance. HENT:      Head: Normocephalic and atraumatic. Nose: Nose normal.      Mouth/Throat:      Mouth: Mucous membranes are moist.      Pharynx: Oropharynx is clear. Eyes:      Conjunctiva/sclera: Conjunctivae normal.   Cardiovascular:      Rate and Rhythm: Normal rate and regular rhythm. Heart sounds: Normal heart sounds. Pulmonary:      Effort: Pulmonary effort is normal.      Breath sounds: Normal breath sounds. Abdominal:      General: Abdomen is flat. Bowel sounds are normal.      Palpations: Abdomen is soft. Musculoskeletal:      Cervical back: Neck supple. Right lower leg: No edema. Left lower leg: No edema. Skin:     General: Skin is warm and dry. Neurological:      General: No focal deficit present. Mental Status: She is alert and oriented to person, place, and time.    Psychiatric:         Mood and Affect: Mood normal.         Diagnostic Study Results     Labs -     Recent Results (from the past 12 hour(s))   URINALYSIS W/ RFLX MICROSCOPIC    Collection Time: 07/17/21  9:55 PM   Result Value Ref Range    Color Yellow      Appearance Cloudy (A) Clear      Specific gravity 1.020 1.003 - 1.030      pH (UA) 6.5 5.0 - 8.0      Protein 30 (A) Negative mg/dL    Glucose Negative Negative mg/dL    Ketone Negative Negative mg/dL    Bilirubin Negative Negative      Blood Large (A) Negative      Urobilinogen 0.1 (L) 0.2 - 1.0 EU/dL    Nitrites Negative Negative      Leukocyte Esterase Large (A) Negative     URINE MICROSCOPIC    Collection Time: 07/17/21  9:55 PM   Result Value Ref Range    WBC 20-50 0 - 4 /hpf    RBC 5-10 0 - 5 /hpf    Epithelial cells Few Few /lpf    Bacteria 4+ (A) Negative /hpf   HCG URINE, QL    Collection Time: 07/17/21  9:56 PM   Result Value Ref Range    HCG urine, QL Negative Negative         Radiologic Studies -   No orders to display     CT Results  (Last 48 hours)    None        CXR Results  (Last 48 hours)    None            Medical Decision Making   I am the first provider for this patient. I reviewed the vital signs, available nursing notes, past medical history, past surgical history, family history and social history. Vital Signs-Reviewed the patient's vital signs. Patient Vitals for the past 12 hrs:   Temp Pulse Resp BP SpO2   07/17/21 2200 98.3 °F (36.8 °C) 70 18 126/82 100 %       Records Reviewed:     Provider Notes (Medical Decision Making):       ED Course:   Initial assessment performed. The patients presenting problems have been discussed, and they are in agreement with the care plan formulated and outlined with them. I have encouraged them to ask questions as they arise throughout their visit. PROCEDURES      Disposition: Condition stable   DC- Adult Discharges: All of the diagnostic tests were reviewed and questions answered. Diagnosis, care plan and treatment options were discussed. understand instructions and will follow up as directed. The patients results have been reviewed with them. They have been counseled regarding their diagnosis. The patient verbally convey understanding and agreement of the signs, symptoms, diagnosis, treatment and prognosis and additionally agrees to follow up as recommended. They also agree with the care-plan and convey that all of their questions have been answered.   I have also put together some discharge instructions for them that include: 1) educational information regarding their diagnosis, 2) how to care for their diagnosis at home, as well a 3) list of reasons why they would want to return to the ED prior to their follow-up appointment, should their condition change. PLAN:  1. Current Discharge Medication List      START taking these medications    Details   trimethoprim-sulfamethoxazole (Bactrim DS) 160-800 mg per tablet Take 1 Tablet by mouth two (2) times a day for 7 days. Qty: 14 Tablet, Refills: 0  Start date: 7/17/2021, End date: 7/24/2021      ibuprofen (MOTRIN) 800 mg tablet Take 1 Tablet by mouth every eight (8) hours as needed for Pain for up to 7 days. Qty: 20 Tablet, Refills: 0  Start date: 7/17/2021, End date: 7/24/2021           2. Follow-up Information     Follow up With Specialties Details Why Contact Info    Follow up with your primary care physician  Schedule an appointment as soon as possible for a visit in 3 days As needed         Return to ED if worse     Diagnosis     Clinical Impression:   1. Urinary tract infection without hematuria, site unspecified        Please note that this dictation was completed with [email or web address], the computer voice recognition software. Quite often unanticipated grammatical, syntax, homophones, and other interpretive errors are inadvertently transcribed by the computer software. Please disregard these errors. Please excuse any errors that have escaped final proofreading. Thank you.

## 2021-08-16 ENCOUNTER — HOSPITAL ENCOUNTER (OUTPATIENT)
Dept: PHYSICAL THERAPY | Age: 31
Discharge: HOME | End: 2021-08-16
Payer: MEDICAID

## 2021-08-16 PROCEDURE — 97110 THERAPEUTIC EXERCISES: CPT

## 2021-08-16 PROCEDURE — 97162 PT EVAL MOD COMPLEX 30 MIN: CPT

## 2021-08-16 NOTE — PROGRESS NOTES
[address]  Ph: [phone]    Fax: [phone]    Initial Evaluation/Plan of Care/Statement of Necessity for Physical Therapy Services     Patient name: Pranay Roe    Date/Start of Care:2021  : 1990  [x]  Patient  Verified Provider#: [id number]          Referral source: Rikki Mayberry MD Return visit to MD: ? Medical/Treatment Diagnosis: Leg pain, bilateral [M79.604, M79.605]  Complex regional pain syndrome i of unspecified lower limb [G90.529]    Payor: [employer] / [place] [employer] [employer] / Product Type: Managed Care Medicaid /       Prior Hospitalization: see medical history     Comorbidities: see chart   Prior Level of Function: Independent   Medications: Verified on Patient Summary List          Patient / Family readiness to learn indicated by: asking questions, trying to perform skills and interest  Persons(s) to be included in education: patient (P)  Barriers to Learning/Limitations:   none  Patient Self Reported Health Status: fair  Rehabilitation Potential: fair-good   Previous Treatment/Compliance: 6 weeks PT twice and home PT . PMHx/Surgical Hx: depression, latex allergy , asthma and tobacco used. Work Hx: unemployed   Living Situation: lives with family and kids  Barriers to progress: chronicity of pain, hypersensitivity. Motivation: good  Substance use: N/A  Cognition: A & O x 4   Onset Date: 2020   SUBJECTIVE  Patient was referred to PT due to (L) knee pain that started on 2020. Patient stated she fell in a hole   Knee and ankle pain. Patient reports she fell in a hole of [place] in 2020, MRI was done and she was told she had a meniscus tear, she was sent to PT. She had 2 bouts of PT for 6 weeks each but she still has pain and she was mostly  doing the TENS unit.  Patient reports having (L) knee swelling, she  can not stand for too long and  it gets discolored and she has numbness going towards the (L) foot. . She also reports difficulty moving her (L) ankle due to knee pain. She has not done any recent imaging since March of 2020. She reports pain in the front and back of her knee joint, that is sharp/stubbing pain sensation with any movement her leg does and occasional popping sensation and bucking. She reports multiple falls due to knee instability. Activities that produce pain: standing, walking and  any type of movement. Activities that make it better: she tried wearing a brace but it did not work as much. Patient also stated that pain keeps her up at night. She reports functional limitations with getting in/out of a shower, moving in bed, walking and going going up/down the stairs as well as carrying groceries and lying on the (L) side while sleeping. Imaging results:  1 . Study limited by motion artifact, 2. High-grade partial tear proximal MCL, 3. Bony contusions to the far posterior medial and lateral femoral condyles  4. Abnormal T2 signal posterior horn medial meniscus extending to the junction with the body. This does not reach the articular surface. Favor meniscal  Contusion 5. Question thinning of the cartilage superior lateral patellar facet  Goal:\"To be able to walk right without any pain\". Mechanism of Injury: Fall   Area of pain: (L) knee and ankle   Pain Descriptors:  Sharp pain   Pain Level (0-10 scale)  At rest: 7/10  With activity: 10/10    Worst: 10/10   Least: 4/10  Objective/Functional Measures including ROM/MMT:   Physical Findings   Ortho:   Posture:  (L) knee slight bent, increased WB on R LE and pigeon toes  Gait and Functional Mobility:  Antalgic gait pattern.    WBS:  TWB   Palpation: TTP on quad tendon, M/L knee joint line, (L) hamstring tendon   Swelling: (L) ankle   Gross findings:  Bruised on medical knee joint   Specific joints: *normal values in ()  KNEE        AROM          PROM                       MMT   R L R L R L   Extension (0)  0 -10    4 3- at knee p! Flexion (145) 140  125   4 4- p! Post knee    Patellar Mobility:  Hypermobility on (L) knee cap    Additional comments: limited knee fleixon in prone   R 36.5cm   L 36.5     HIP     AROM       PROM             MMT   R L R L R L   Flexion (120)     5 4   Extension (15)     3- 3-   Abduction (40)     -4 3+   Adduction (30)     3+ 2+   IR (40)         ER (40)         Additional comments:    ANKLE                               AROM                     PROM                     MMT:   R L R L R L   Dorsiflexion (15)  10 Neurtral    4 4-p! At knee    Plantarflexion (50) 50 50   4 4-p! At knee    Inversion (35)  20 35   4 3-   Eversion (25) 15 8   4 3-   Additional comments: (L) ankle excessive inversion. limited evertion and painful  R 24cm   (L) 25.5 cm   Mobility Assessment:       Neurological: Reflexes / Sensations: intact to light touch but reports tingling sensation in (L) foot. Special Tests: \  Tender with special test, unable to formally assess reports pain with each knee movement. Ankle - (L) ankle sprain   (+) Talar test and (+) anterior drawer test.  Gait and Functional Mobility:  Transfers:   Bed mobility: independent but reports pain with (L) side lying. Sit to/from Supine: independent   Sit to/from stands: independent with increased WB on (R) LE   Gait: antalgic gait pattern  Assistive device:  none    TUG test: 14 sec without device    Stairs: Educated on step to step pattern (-Patient negotiated 4(6'')steps)   SLS: (R) 3 sec ) , (L) 1sec   Squat: 1/2 way pain in front of (L) knee and WB on R mostly   Sit<-. 30sec   5 reps = 21 sec /  7 reps = 30sec   Five times sit to stand:    Normative averages:  Clients younger than 61years old  £  10 seconds = Normal   Clients older than 61years old £ 14.2 seconds = Normal   Change of ³ 2.3 seconds shows a significant clinical improvement    Starr RW.  Reference values for the five repetition sit to stand test: a descriptive metaanalysis of data from elders. Percept Mot Skills 2006; 103(1):215-222. [facility] Score:   54.86%  ASSESSMENT/Changes in Function:   Patient is a 30 y/o female dx with (L) knee pain and complex regional pian syndrom on (L) lower leg. Patient presents with limited (L) knee and ankle AROM, knee instability, decreased strength, hypersensitivity to touch, decreased strength, decreased flexibility, decreased balance and gait impairments. Patient may also presents with mild (L) ankle sprain along with knee instability. Patient will benefit from skilled PT services to address above. Problem List/Impairments: pain affecting function, decrease ROM, decrease strength, edema affecting function, impaired gait/ balance, decrease ADL/ functional abilitiies, decrease activity tolerance, decrease flexibility/ joint mobility and decrease transfer abilities  Treatment Plan may include any combination of the following: Therapeutic exercise, Neuromuscular re-education, Physical agent/modality, Gait/balance training, Manual therapy, Patient education, Functional mobility training and Stair training  Patient/ Caregiver education and instruction: exercises  Frequency / Duration: Patient to be seen 2 times per week for 12 treatments. Certification Period: 8/16/21-11/16/21  Patient Goal (s): \"To be cristina to walk right without any pain\". [] Met [] Not met [] Partially met   Short Term Goals: To be accomplished in 2-4  treatments. 1. Patient will progress with her HEP to progress with POC. 2. Patient pain level with reduce to < or = to 4/10 to improve mobility. 3. Patient TUG score will reduce by 2-3 point to reduce her fall risk. 4. Patient will be able to perform a SLS x 5 sec on B LEs to improve staility. 5. Patient will gain knee flexion and knee extension AROM by 2-5 deg in each direction. Long Term Goals: To be accomplished in 12 treatments.   1. Patient will be able to ambulate in community with LRAD and less of an antalgic gait pattern. 2. Patient will be able to negotiate 4(6'') steps with SOS pattern and LRAD independently. 3. Patient will be able to get in/out of bed and shower with more ease and less of knee pain. TODAY'S TREATMENT:  Visit #: 1 Visit count could not be calculated. Make sure you are using a visit which is associated with an episode. In time:2:00pm Out time:3:11 Total Treatment Time (min): 70   Total Timed Codes (min): 10 min  Pain Level (0-10 scale) pre treatment: 7/10  Pain Level (0-10 scale) post treatment: 7/10   Modality rationale: decrease edema, decrease inflammation, decrease pain, increase tissue extensibility and increase muscle contraction/control to improve the patients ability to reduce pain. Min Type Additional Details    [] Estim: []UnAtt   []Att       []TENS instruct                  []IFC  []Premod   []NMES []w/US   []w/ice   []w/heat  Position:                                     Location:   2-3 min  [x]  Ice     []  Heat  []  Ice massage Position:supine   Location:(L) knee /(L) ankle   * Patient brother who was present stated  patient only stay with it for 2-3 min on vs 10 min. []  Traction: [] Cervical       []Lumbar                       []Intermittent   []Continuous                     Lbs:  []W/heat               []W/heat and Estim    []  Ultrasound: []Continuous   [] Pulsed at:                           []1MHz   []3MHz Location:  W/cm2:    [] Skin assessment post-treatment:  []intact        []redness- no adverse reaction     []redness - adverse reaction:   10 min Therapeutic Exercise:  [x] See flow sheet :   Rationale: increase ROM, increase strength, improve coordination, improve balance and increase proprioception to improve the patients ability to ambulate with less antalgic gait pattern.    With   [x] TE   [] TA   [] Neuro   [] SC   [] other: Patient Education: [x] Review HEP    [] Progressed/Changed HEP based on:   [] positioning   [] body mechanics   [] transfers   [] heat/ice application    [] other:        [x]  Plan of care has been reviewed with PTA. The Plan of Care is based on information from the initial evaluation. Alexa Neumann, PT, DPT 8/16/2021   ________________________________________________________________________    I certify that the above Therapy Services are being furnished while the patient is under my care. I agree with the treatment plan and certify that this therapy is necessary.     Physician's Signature:_________________________________________________  Date:____________Time: ____________     Nancy Prince MD

## 2021-08-23 ENCOUNTER — HOSPITAL ENCOUNTER (OUTPATIENT)
Dept: PHYSICAL THERAPY | Age: 31
Discharge: HOME | End: 2021-08-23
Payer: MEDICAID

## 2021-08-23 PROCEDURE — 97110 THERAPEUTIC EXERCISES: CPT

## 2021-08-23 NOTE — PROGRESS NOTES
PT DAILY TREATMENT NOTE  [facility]     Patient Name: Kayy Francis  Date:2021  : 1990  [x]  Patient  Verified  Payor: Jayla Calderon / Plan: [id number] / Product Type: Managed Care Medicaid /    Treatment Area: Leg pain, bilateral [M79.604, M79.605]  Complex regional pain syndrome i of unspecified lower limb [G90.529]       Next MD APPT:   In time:01:15 pm  Out time:02:55 pm  Total Treatment Time (min):40 min  Visit #:  Visit count could not be calculated. Make sure you are using a visit which is associated with an episode. SUBJECTIVE  Pain Level (0-10 scale) pre treatment:7/10    Pain Level (0-10 scale) post treatment: 7/10  Any medication changes, allergies to medications, adverse drug reactions, diagnosis change, or new procedure performed?:   [] No    [] Yes (see summary sheet for update)  Subjective functional status/changes:   [] No changes reported  Patient stated her L knee hurts on both side and posteriorly. Patient ambulating without any AD into department. She states any time she does anything her knee swell and hurts more. Patient also asked about sleeping positions due to L hip/back starting to bother her at night. OBJECTIVE       40 min Therapeutic Exercise:  [] See flow sheet :   Rationale: increase ROM, increase strength, improve balance and increase proprioception to improve the patients ability to ambulate safely and normal        With   [x] TE   [] TA   [] Neuro   [] SC   [] other: Patient Education: [x] Review HEP    [] Progressed/Changed HEP based on:   [] positioning   [] body mechanics   [] transfers   [] Use of heat/ice     [x] other: Reviewed HEP and taped L Patella tendon        Other Objective/Functional Measures:      ASSESSMENT/Changes in Function:   Patient reported decrease in pain with exercises with patella tendon taping. Did advised patient when and how to take tape off.  Also discussed with patient the importance of performing exercises and getting L knee straight. Patient keeps L LE bent at all times even at rest. She was given HEP and copy is in chart. Patient will continue to benefit from skilled PT services to address functional mobility deficits, address ROM deficits, address strength deficits and analyze and address soft tissue restrictions to attain remaining goals. GOALS/Progress towards goals:    Patient Goal (s): \"To be cristina to walk right without any pain\". []? Met []? Not met []? Partially met   Short Term Goals: To be accomplished in 2-4  treatments. 1. Patient will progress with her HEP to progress with POC. [] Met [] Not met [] Partially met   2. Patient pain level with reduce to < or = to 4/10 to improve mobility. [] Met [] Not met [] Partially met   3. Patient TUG score will reduce by 2-3 point to reduce her fall risk. [] Met [] Not met [] Partially met   4. Patient will be able to perform a SLS x 5 sec on B LEs to improve staility. [] Met [] Not met [] Partially met   5. Patient will gain knee flexion and knee extension AROM by 2-5 deg in each direction. [] Met [] Not met [] Partially met       Long Term Goals: To be accomplished in 12 treatments. 1. Patient will be able to ambulate in community with LRAD and less of an antalgic gait pattern. [] Met [] Not met [] Partially met   2. Patient will be able to negotiate 4(6'') steps with SOS pattern and LRAD independently. [] Met [] Not met [] Partially met   3. Patient will be able to get in/out of bed and shower with more ease and less of knee pain. [] Met [] Not met [] Partially met      Treatment Plan may include any combination of the following: Therapeutic exercise, Neuromuscular re-education, Physical agent/modality, Gait/balance training, Manual therapy, Patient education, Functional mobility training and Stair training    Frequency / Duration: Patient to be seen 2 times per week for 12 treatments.   Certification Period: 8/16/21-11/16/21    PLAN  [x]  Upgrade activities as tolerated [x]  Continue plan of care  [x]  Update interventions per flow sheet       []  Discharge due to:_  []  Other:_      Lily Moran, PTA 8/23/2021

## 2021-08-27 ENCOUNTER — APPOINTMENT (OUTPATIENT)
Dept: PHYSICAL THERAPY | Age: 31
End: 2021-08-27
Payer: MEDICAID

## 2021-08-30 ENCOUNTER — APPOINTMENT (OUTPATIENT)
Dept: PHYSICAL THERAPY | Age: 31
End: 2021-08-30
Payer: MEDICAID

## 2021-09-02 ENCOUNTER — APPOINTMENT (OUTPATIENT)
Dept: PHYSICAL THERAPY | Age: 31
End: 2021-09-02
Payer: MEDICAID

## 2021-09-10 ENCOUNTER — HOSPITAL ENCOUNTER (OUTPATIENT)
Dept: PHYSICAL THERAPY | Age: 31
Discharge: HOME | End: 2021-09-10
Payer: MEDICAID

## 2021-09-10 PROCEDURE — 97110 THERAPEUTIC EXERCISES: CPT

## 2021-09-10 NOTE — PROGRESS NOTES
PT DAILY TREATMENT NOTE  [facility]     Patient Name: Ching Reyes  Date:9/10/2021  : 1990  [x]  Patient  Verified  Payor: Jorge L Sam / Plan: Machelle Sidhu / Product Type: Managed Care Medicaid /    Treatment Area: Leg pain, bilateral [M79.604, M79.605]  Complex regional pain syndrome i of unspecified lower limb [G90.529]       Next MD APPT: not scheduled  In time:1135am  Out time: 1208am  Total Treatment Time (min):30 min  Visit #: 3/12 Visit count could not be calculated. Make sure you are using a visit which is associated with an episode. SUBJECTIVE  Pain Level (0-10 scale) pre treatment: 810    Pain Level (0-10 scale) post treatment: 10  Any medication changes, allergies to medications, adverse drug reactions, diagnosis change, or new procedure performed?:   [] No    [] Yes (see summary sheet for update)  Subjective functional status/changes:   [] No changes reported  Pt states pain 8/10. She hopes to get an MRI after 6 weeks of therapy. OBJECTIVE     30 min Therapeutic Exercise:  [x] See flow sheet :   Rationale: increase ROM, increase strength, improve balance and increase proprioception to improve the patients ability to ambulate safely and normal    With   [x] TE   [] TA   [] Neuro   [] SC   [] other: Patient Education: [x] Review HEP    [] Progressed/Changed HEP based on:   [] positioning   [] body mechanics   [] transfers   [] Use of heat/ice     [x] other: Reviewed HEP and taped L Patella tendon        Other Objective/Functional Measures:    Red streaking around knee joint and into R shin and calf, patellar hypermobility    ASSESSMENT/Changes in Function:   Pt discouraged about lack of progress regarding pain levels and continued knee instability. Pt was able to increase exercises but did note increase antalgic pattern post-treatment. Performed desensitization and gentle massage over R knee and shin. Declined ice, states she will do this at home.  Patient will continue to benefit from skilled PT services to address functional mobility deficits, address ROM deficits, address strength deficits and analyze and address soft tissue restrictions to attain remaining goals. GOALS/Progress towards goals:    Patient Goal (s): \"To be cristina to walk right without any pain\". []? Met []? Not met []? Partially met   Short Term Goals: To be accomplished in 2-4  treatments. 1. Patient will progress with her HEP to progress with POC. [x] Met [] Not met [] Partially met  9/10 reports compliance  2. Patient pain level with reduce to < or = to 4/10 to improve mobility. [] Met [] Not met [] Partially met   3. Patient TUG score will reduce by 2-3 point to reduce her fall risk. [] Met [] Not met [] Partially met   4. Patient will be able to perform a SLS x 5 sec on B LEs to improve staility. [] Met [] Not met [] Partially met   5. Patient will gain knee flexion and knee extension AROM by 2-5 deg in each direction. [] Met [] Not met [] Partially met       Long Term Goals: To be accomplished in 12 treatments. 1. Patient will be able to ambulate in community with LRAD and less of an antalgic gait pattern. [] Met [] Not met [] Partially met   2. Patient will be able to negotiate 4(6'') steps with SOS pattern and LRAD independently. [] Met [] Not met [] Partially met   3. Patient will be able to get in/out of bed and shower with more ease and less of knee pain. [] Met [] Not met [] Partially met      Treatment Plan may include any combination of the following: Therapeutic exercise, Neuromuscular re-education, Physical agent/modality, Gait/balance training, Manual therapy, Patient education, Functional mobility training and Stair training    Frequency / Duration: Patient to be seen 2 times per week for 12 treatments.   Certification Period: 8/16/21-11/16/21    PLAN  [x]  Upgrade activities as tolerated     [x]  Continue plan of care  [x]  Update interventions per flow sheet       []  Discharge due to:_  []  Other:_ Janis Hummel, PT, DPT 9/10/2021

## 2021-09-14 ENCOUNTER — APPOINTMENT (OUTPATIENT)
Dept: PHYSICAL THERAPY | Age: 31
End: 2021-09-14
Payer: MEDICAID

## 2021-09-15 ENCOUNTER — APPOINTMENT (OUTPATIENT)
Dept: PHYSICAL THERAPY | Age: 31
End: 2021-09-15
Payer: MEDICAID

## 2021-09-16 ENCOUNTER — APPOINTMENT (OUTPATIENT)
Dept: PHYSICAL THERAPY | Age: 31
End: 2021-09-16
Payer: MEDICAID

## 2021-09-21 ENCOUNTER — APPOINTMENT (OUTPATIENT)
Dept: PHYSICAL THERAPY | Age: 31
End: 2021-09-21
Payer: MEDICAID

## 2021-09-23 ENCOUNTER — APPOINTMENT (OUTPATIENT)
Dept: PHYSICAL THERAPY | Age: 31
End: 2021-09-23
Payer: MEDICAID

## 2021-09-28 ENCOUNTER — APPOINTMENT (OUTPATIENT)
Dept: PHYSICAL THERAPY | Age: 31
End: 2021-09-28
Payer: MEDICAID

## 2021-09-30 ENCOUNTER — APPOINTMENT (OUTPATIENT)
Dept: PHYSICAL THERAPY | Age: 31
End: 2021-09-30
Payer: MEDICAID

## 2021-10-20 ENCOUNTER — APPOINTMENT (OUTPATIENT)
Dept: ULTRASOUND IMAGING | Age: 31
End: 2021-10-20
Attending: NURSE PRACTITIONER
Payer: MEDICAID

## 2021-10-20 ENCOUNTER — HOSPITAL ENCOUNTER (EMERGENCY)
Age: 31
Discharge: HOME | End: 2021-10-20
Attending: EMERGENCY MEDICINE
Payer: MEDICAID

## 2021-10-20 ENCOUNTER — APPOINTMENT (OUTPATIENT)
Dept: CT IMAGING | Age: 31
End: 2021-10-20
Attending: NURSE PRACTITIONER
Payer: MEDICAID

## 2021-10-20 VITALS
HEART RATE: 64 BPM | OXYGEN SATURATION: 100 % | TEMPERATURE: 98.1 F | SYSTOLIC BLOOD PRESSURE: 128 MMHG | BODY MASS INDEX: 24.64 KG/M2 | RESPIRATION RATE: 14 BRPM | WEIGHT: 157 LBS | HEIGHT: 67 IN | DIASTOLIC BLOOD PRESSURE: 87 MMHG

## 2021-10-20 DIAGNOSIS — K59.00 CONSTIPATION, UNSPECIFIED CONSTIPATION TYPE: ICD-10-CM

## 2021-10-20 DIAGNOSIS — R10.31 ABDOMINAL PAIN, RIGHT LOWER QUADRANT: Primary | ICD-10-CM

## 2021-10-20 LAB
ALBUMIN SERPL-MCNC: 4.1 G/DL (ref 3.5–5)
ALBUMIN/GLOB SERPL: 1.3 {RATIO} (ref 1.1–2.2)
ALP SERPL-CCNC: 59 U/L (ref 45–117)
ALT SERPL-CCNC: 19 U/L (ref 12–78)
ANION GAP SERPL CALC-SCNC: 5 MMOL/L (ref 5–15)
APPEARANCE UR: ABNORMAL
AST SERPL W P-5'-P-CCNC: 26 U/L (ref 15–37)
BACTERIA URNS QL MICRO: NEGATIVE /HPF
BASOPHILS # BLD: 0.1 K/UL (ref 0–0.1)
BASOPHILS NFR BLD: 1 % (ref 0–1)
BILIRUB SERPL-MCNC: 0.4 MG/DL (ref 0.2–1)
BILIRUB UR QL: NEGATIVE
BUN SERPL-MCNC: 12 MG/DL (ref 6–20)
BUN/CREAT SERPL: 15 (ref 12–20)
CA-I BLD-MCNC: 9.3 MG/DL (ref 8.5–10.1)
CHLORIDE SERPL-SCNC: 113 MMOL/L (ref 97–108)
CO2 SERPL-SCNC: 25 MMOL/L (ref 21–32)
COLOR UR: ABNORMAL
CREAT SERPL-MCNC: 0.82 MG/DL (ref 0.55–1.02)
DIFFERENTIAL METHOD BLD: ABNORMAL
EOSINOPHIL # BLD: 0.7 K/UL (ref 0–0.4)
EOSINOPHIL NFR BLD: 10 % (ref 0–7)
ERYTHROCYTE [DISTWIDTH] IN BLOOD BY AUTOMATED COUNT: 13.7 % (ref 11.5–14.5)
GLOBULIN SER CALC-MCNC: 3.1 G/DL (ref 2–4)
GLUCOSE SERPL-MCNC: 85 MG/DL (ref 65–100)
GLUCOSE UR STRIP.AUTO-MCNC: NEGATIVE MG/DL
HCG SERPL QL: NEGATIVE
HCT VFR BLD AUTO: 44.7 % (ref 35–47)
HGB BLD-MCNC: 14.5 G/DL (ref 11.5–16)
HGB UR QL STRIP: NEGATIVE
IMM GRANULOCYTES # BLD AUTO: 0 K/UL (ref 0–0.04)
IMM GRANULOCYTES NFR BLD AUTO: 0 % (ref 0–0.5)
KETONES UR QL STRIP.AUTO: NEGATIVE MG/DL
LEUKOCYTE ESTERASE UR QL STRIP.AUTO: ABNORMAL
LIPASE SERPL-CCNC: 80 U/L (ref 73–393)
LYMPHOCYTES # BLD: 1.8 K/UL (ref 0.8–3.5)
LYMPHOCYTES NFR BLD: 25 % (ref 12–49)
MCH RBC QN AUTO: 29.1 PG (ref 26–34)
MCHC RBC AUTO-ENTMCNC: 32.4 G/DL (ref 30–36.5)
MCV RBC AUTO: 89.8 FL (ref 80–99)
MONOCYTES # BLD: 0.4 K/UL (ref 0–1)
MONOCYTES NFR BLD: 6 % (ref 5–13)
MUCOUS THREADS URNS QL MICRO: ABNORMAL /LPF
NEUTS SEG # BLD: 4.1 K/UL (ref 1.8–8)
NEUTS SEG NFR BLD: 58 % (ref 32–75)
NITRITE UR QL STRIP.AUTO: NEGATIVE
NRBC # BLD: 0 K/UL (ref 0–0.01)
NRBC BLD-RTO: 0 PER 100 WBC
PH UR STRIP: 6 [PH] (ref 5–8)
PLATELET # BLD AUTO: 205 K/UL (ref 150–400)
PMV BLD AUTO: 11.1 FL (ref 8.9–12.9)
POTASSIUM SERPL-SCNC: 4.9 MMOL/L (ref 3.5–5.1)
PROT SERPL-MCNC: 7.2 G/DL (ref 6.4–8.2)
PROT UR STRIP-MCNC: NEGATIVE MG/DL
RBC # BLD AUTO: 4.98 M/UL (ref 3.8–5.2)
RBC #/AREA URNS HPF: ABNORMAL /HPF (ref 0–5)
SODIUM SERPL-SCNC: 143 MMOL/L (ref 136–145)
SP GR UR REFRACTOMETRY: 1.02 (ref 1–1.03)
UA: UC IF INDICATED,UAUC: ABNORMAL
UROBILINOGEN UR QL STRIP.AUTO: 0.1 EU/DL (ref 0.1–1)
WBC # BLD AUTO: 7.1 K/UL (ref 3.6–11)
WBC URNS QL MICRO: ABNORMAL /HPF (ref 0–4)

## 2021-10-20 PROCEDURE — 83690 ASSAY OF LIPASE: CPT

## 2021-10-20 PROCEDURE — 80053 COMPREHEN METABOLIC PANEL: CPT

## 2021-10-20 PROCEDURE — 99283 EMERGENCY DEPT VISIT LOW MDM: CPT

## 2021-10-20 PROCEDURE — 84703 CHORIONIC GONADOTROPIN ASSAY: CPT

## 2021-10-20 PROCEDURE — 74177 CT ABD & PELVIS W/CONTRAST: CPT

## 2021-10-20 PROCEDURE — 76705 ECHO EXAM OF ABDOMEN: CPT

## 2021-10-20 PROCEDURE — 74011000636 HC RX REV CODE- 636: Performed by: NURSE PRACTITIONER

## 2021-10-20 PROCEDURE — 96376 TX/PRO/DX INJ SAME DRUG ADON: CPT

## 2021-10-20 PROCEDURE — 74011250636 HC RX REV CODE- 250/636: Performed by: NURSE PRACTITIONER

## 2021-10-20 PROCEDURE — 96375 TX/PRO/DX INJ NEW DRUG ADDON: CPT

## 2021-10-20 PROCEDURE — 85025 COMPLETE CBC W/AUTO DIFF WBC: CPT

## 2021-10-20 PROCEDURE — 81001 URINALYSIS AUTO W/SCOPE: CPT

## 2021-10-20 PROCEDURE — 99284 EMERGENCY DEPT VISIT MOD MDM: CPT

## 2021-10-20 PROCEDURE — 96374 THER/PROPH/DIAG INJ IV PUSH: CPT

## 2021-10-20 PROCEDURE — 36415 COLL VENOUS BLD VENIPUNCTURE: CPT

## 2021-10-20 RX ORDER — HYDROMORPHONE HYDROCHLORIDE 1 MG/ML
1 INJECTION, SOLUTION INTRAMUSCULAR; INTRAVENOUS; SUBCUTANEOUS ONCE
Status: COMPLETED | OUTPATIENT
Start: 2021-10-20 | End: 2021-10-20

## 2021-10-20 RX ORDER — ONDANSETRON 2 MG/ML
4 INJECTION INTRAMUSCULAR; INTRAVENOUS
Status: COMPLETED | OUTPATIENT
Start: 2021-10-20 | End: 2021-10-20

## 2021-10-20 RX ORDER — MORPHINE SULFATE 4 MG/ML
4 INJECTION INTRAVENOUS ONCE
Status: COMPLETED | OUTPATIENT
Start: 2021-10-20 | End: 2021-10-20

## 2021-10-20 RX ORDER — ONDANSETRON 4 MG/1
4 TABLET, ORALLY DISINTEGRATING ORAL
Qty: 10 TABLET | Refills: 1 | Status: SHIPPED | OUTPATIENT
Start: 2021-10-20

## 2021-10-20 RX ORDER — HYDROMORPHONE HYDROCHLORIDE 1 MG/ML
0.5 INJECTION, SOLUTION INTRAMUSCULAR; INTRAVENOUS; SUBCUTANEOUS ONCE
Status: COMPLETED | OUTPATIENT
Start: 2021-10-20 | End: 2021-10-20

## 2021-10-20 RX ORDER — POLYETHYLENE GLYCOL 3350 17 G/17G
17 POWDER, FOR SOLUTION ORAL DAILY
Qty: 235 G | Refills: 0 | Status: SHIPPED | OUTPATIENT
Start: 2021-10-20

## 2021-10-20 RX ORDER — HYDROCODONE BITARTRATE AND ACETAMINOPHEN 5; 325 MG/1; MG/1
1 TABLET ORAL
Qty: 12 TABLET | Refills: 0 | Status: SHIPPED | OUTPATIENT
Start: 2021-10-20 | End: 2021-10-23

## 2021-10-20 RX ORDER — HYDROCODONE BITARTRATE AND ACETAMINOPHEN 5; 325 MG/1; MG/1
1 TABLET ORAL
Qty: 12 TABLET | Refills: 0 | Status: SHIPPED | OUTPATIENT
Start: 2021-10-20 | End: 2021-10-20

## 2021-10-20 RX ADMIN — IOPAMIDOL 100 ML: 755 INJECTION, SOLUTION INTRAVENOUS at 11:23

## 2021-10-20 RX ADMIN — HYDROMORPHONE HYDROCHLORIDE 0.5 MG: 1 INJECTION, SOLUTION INTRAMUSCULAR; INTRAVENOUS; SUBCUTANEOUS at 12:21

## 2021-10-20 RX ADMIN — HYDROMORPHONE HYDROCHLORIDE 1 MG: 1 INJECTION, SOLUTION INTRAMUSCULAR; INTRAVENOUS; SUBCUTANEOUS at 10:28

## 2021-10-20 RX ADMIN — MORPHINE SULFATE 4 MG: 4 INJECTION INTRAVENOUS at 09:28

## 2021-10-20 RX ADMIN — ONDANSETRON 4 MG: 2 INJECTION INTRAMUSCULAR; INTRAVENOUS at 09:29

## 2021-10-20 RX ADMIN — SODIUM CHLORIDE 1000 ML: 9 INJECTION, SOLUTION INTRAVENOUS at 09:28

## 2021-10-20 NOTE — ED PROVIDER NOTES
EMERGENCY DEPARTMENT HISTORY AND PHYSICAL EXAM      Date: 10/20/2021  Patient Name: Adriano Navas    History of Presenting Illness     Chief Complaint   Patient presents with    Abdominal Pain       History Provided By: Patient    HPI: Adriano Navas, 36 y.o. female with a past medical history significant PTSD, depression, bipolar, ADHD presents to the ED with cc of Right-sided abdominal pain. Patient states for the last week she has been very depressed. Patient has not been getting up to do much of anything. Patient complains of right-sided abdominal pain. She also endorses nausea and diarrhea. Patient denies any fevers. Moderate severity, no known exacerbating or relieving factors, no other associated signs and symptoms    There are no other complaints, changes, or physical findings at this time. PCP: Holley Moe MD    No current facility-administered medications on file prior to encounter. Current Outpatient Medications on File Prior to Encounter   Medication Sig Dispense Refill    butalbital-acetaminophen-caff (FIORICET) -40 mg per capsule Take 1 Cap by mouth every four (4) hours as needed for Headache. 10 Cap 0    ALPRAZolam (XANAX) 1 mg tablet TK 1 T PO  TID PRN  1       Past History     Past Medical History:  Past Medical History:   Diagnosis Date    ADHD     Bipolar disorder ([facility] [address]     Depression     PTSD (post-traumatic stress disorder)        Past Surgical History:  Past Surgical History:   Procedure Laterality Date    HX PELVIC LAPAROSCOPY      Endometriosis    HX TONSILLECTOMY         Family History:  No family history on file. Social History:  Social History     Tobacco Use    Smoking status: Current Every Day Smoker     Packs/day: 1.00     Types: Cigarettes    Smokeless tobacco: Never Used   Substance Use Topics    Alcohol use: Not Currently    Drug use: Never       Allergies:   Allergies   Allergen Reactions    Latex Hives    Codeine Rash    Demerol [Meperidine] Itching    Penicillins Hives         Review of Systems     Review of Systems   Constitutional: Negative for chills, fatigue and fever. HENT: Negative for congestion, sinus pressure and trouble swallowing. Eyes: Negative for photophobia and pain. Respiratory: Negative for cough and shortness of breath. Cardiovascular: Negative for chest pain and leg swelling. Gastrointestinal: Positive for abdominal pain, diarrhea, nausea and vomiting. Endocrine: Negative for polydipsia, polyphagia and polyuria. Genitourinary: Negative for decreased urine volume, difficulty urinating, dysuria, hematuria and urgency. Musculoskeletal: Negative for back pain, gait problem, myalgias and neck pain. Skin: Negative for pallor and rash. Allergic/Immunologic: Negative for environmental allergies and food allergies. Neurological: Negative for dizziness, facial asymmetry, speech difficulty, numbness and headaches. Hematological: Negative for adenopathy. Does not bruise/bleed easily. Psychiatric/Behavioral: Negative for agitation, self-injury and suicidal ideas. The patient is not nervous/anxious. Physical Exam     Physical Exam  Vitals and nursing note reviewed. Constitutional:       Appearance: Normal appearance. HENT:      Head: Atraumatic. Right Ear: Tympanic membrane and external ear normal.      Left Ear: Tympanic membrane and external ear normal.      Nose: Nose normal.      Mouth/Throat:      Mouth: Mucous membranes are moist.   Eyes:      Extraocular Movements: Extraocular movements intact. Pupils: Pupils are equal, round, and reactive to light. Cardiovascular:      Rate and Rhythm: Normal rate and regular rhythm. Pulses: Normal pulses. Heart sounds: Normal heart sounds. Pulmonary:      Breath sounds: Normal breath sounds. Abdominal:      General: Abdomen is flat. Bowel sounds are normal.      Palpations: Abdomen is soft. Tenderness:  There is abdominal tenderness in the right upper quadrant and right lower quadrant. Musculoskeletal:         General: Normal range of motion. Cervical back: Normal range of motion and neck supple. Skin:     General: Skin is warm and dry. Capillary Refill: Capillary refill takes less than 2 seconds. Neurological:      General: No focal deficit present. Mental Status: She is alert and oriented to person, place, and time. Mental status is at baseline. Psychiatric:         Mood and Affect: Mood normal.         Behavior: Behavior normal.         Lab and Diagnostic Study Results     Labs -   No results found for this or any previous visit (from the past 12 hour(s)). Radiologic Studies -   @lastxrresult@  CT Results  (Last 48 hours)               10/20/21 1122  CT ABD PELV W CONT Final result    Impression:  Hypodensity in the right hepatic lobe, incompletely characterized. Wall prominence in the terminal ileum without associated inflammatory changes. Other findings as above. Narrative:  CT abdomen and pelvis, 10/20/2021       History: Right upper quadrant, right lower quadrant pain. Technique: Oral contrast was not given. Multiple contiguous axial images were   obtained from the diaphragm to the inferior pubic rami following intravenous   administration of 100 mL Isovue 370 contrast material.  Coronal and sagittal   reconstruction of images was made. All CT scans at this facility are performed using dose reduction optimization   techniques as appropriate to perform the exam including the following: Automated   exposure control, adjustments of the mA and/or kV according to patient size, or   use iterative reconstruction technique. Comparison:  Including limited abdominal ultrasound, same day. Findings: The included lung bases show minimal atelectasis. The liver has a subcentimeter hypodensity in the dome, possibly a cyst but too   small to fully characterize.          There is a hypodensity in the posterior segment of the right hepatic lobe   measuring 2.2 cm x 1.3 cm, incompletely characterized. This could be focal   hepatic steatosis, lesion such as hemangioma. Laceration is a consideration in   the setting of trauma. No perihepatic stranding or fluid is evident. This was   not definitively seen on Limited abdominal ultrasound, same day or CT abdomen   and pelvis 4/6/2019. CT abdomen and pelvis 8/23/2016 is not electronically   accessible. The gallbladder, spleen, adrenal glands, pancreas demonstrate no focal   abnormality. The kidneys show no hydronephrosis. A subcentimeter cortical hypodensity in the   left kidney is probably a cyst but not fully characterized. No ventral wall hernia is identified. The stomach is grossly normal contours. There is no evidence of mechanical   bowel obstruction. The terminal ileum has wall prominence without adjacent   inflammatory changes. This could be due to under distention. Sequela of   Crohn's disease is not excluded in the appropriate clinical setting. The   appendix images normally. There is no specific CT evidence of colitis or   diverticulitis. The bladder has no focal wall thickening or intraluminal calculus. The uterus and ovaries showed no focal abnormality. A 1.4 cm hypodensity in the   left ovary suggests a follicle. Adnexal vessels are prominent. Trace free   fluid is seen in the pelvis. The abdominal aorta is normal in caliber. The lumbar spine and bony pelvis show no acute finding. CXR Results  (Last 48 hours)    None            Medical Decision Making   - I am the first provider for this patient. - I reviewed the vital signs, available nursing notes, past medical history, past surgical history, family history and social history. - Initial assessment performed.  The patients presenting problems have been discussed, and they are in agreement with the care plan formulated and outlined with them. I have encouraged them to ask questions as they arise throughout their visit. Vital Signs-Reviewed the patient's vital signs. No data found. Records Reviewed: Nursing Notes and Old Medical Records          ED Course:          Provider Notes (Medical Decision Making):   Pt presents with acute abdominal pain; vital signs stable with currently a non-peritoneal exam; DDx includes: Gastroenteritis, hepatitis, pancreatitis, obstruction, appendicitis, viral illness, IBD, diverticulitis, mesenteric ischemia, AAA or descending dissection, ACS, kidney stone. Will get labs, treat symptomatically and obtain serial abdominal exams to determine if additional imaging is indicated. Will reassess and monitor closely. MDM       Procedures   Medical Decision Makingedical Decision Making  Performed by: Joanne Hazel NP  PROCEDURES:  Procedures       Disposition   Disposition: DC- Adult Discharges: All of the diagnostic tests were reviewed and questions answered. Diagnosis, care plan and treatment options were discussed. The patient understands the instructions and will follow up as directed. The patients results have been reviewed with them. They have been counseled regarding their diagnosis. The patient verbally convey understanding and agreement of the signs, symptoms, diagnosis, treatment and prognosis and additionally agrees to follow up as recommended with their PCP in 24 - 48 hours. They also agree with the care-plan and convey that all of their questions have been answered. I have also put together some discharge instructions for them that include: 1) educational information regarding their diagnosis, 2) how to care for their diagnosis at home, as well a 3) list of reasons why they would want to return to the ED prior to their follow-up appointment, should their condition change. Discharged    DISCHARGE PLAN:  1.    Current Discharge Medication List      CONTINUE these medications which have NOT CHANGED    Details   butalbital-acetaminophen-caff (FIORICET) -40 mg per capsule Take 1 Cap by mouth every four (4) hours as needed for Headache. Qty: 10 Cap, Refills: 0      ALPRAZolam (XANAX) 1 mg tablet TK 1 T PO  TID PRN  Refills: 1           2. Follow-up Information     Follow up With Specialties Details Why Contact Info    Nicolas Tena MD Gastroenterology, Internal Medicine   [address] [place] [address] Saira Cochran MD Family Medicine   [address] [place] [address]  [phone]          3. Return to ED if worse   4. Discharge Medication List as of 10/20/2021  1:03 PM      START taking these medications    Details   ondansetron (Zofran ODT) 4 mg disintegrating tablet Take 1 Tablet by mouth every eight (8) hours as needed for Nausea or Vomiting., Normal, Disp-10 Tablet, R-1      polyethylene glycol (Miralax) 17 gram/dose powder Take 17 g by mouth daily. 1 capful twice daily until good bowel movement then 1 tablespoon with 8 oz of water daily, Normal, Disp-235 g, R-0      HYDROcodone-acetaminophen (Lorcet, HYDROcodone,) 5-325 mg per tablet Take 1 Tablet by mouth every six (6) hours as needed for Pain for up to 3 days. Max Daily Amount: 4 Tablets., Normal, Disp-12 Tablet, R-0         CONTINUE these medications which have NOT CHANGED    Details   butalbital-acetaminophen-caff (FIORICET) -40 mg per capsule Take 1 Cap by mouth every four (4) hours as needed for Headache., Normal, Disp-10 Cap,R-0      ALPRAZolam (XANAX) 1 mg tablet TK 1 T PO  TID PRN, Historical Med, R-1               Diagnosis     Clinical Impression:   1. Abdominal pain, right lower quadrant    2. Constipation, unspecified constipation type        Attestations:    Fer Jackman NP    Please note that this dictation was completed with [employer], the [employer] voice recognition software.   Quite often unanticipated grammatical, syntax, homophones, and other interpretive errors are inadvertently transcribed by the computer software. Please disregard these errors. Please excuse any errors that have escaped final proofreading. Thank you.

## 2021-10-20 NOTE — ED TRIAGE NOTES
Patient stated she has a pain in her right flank, upper and lower, that feels like she is being punched. Pt endourced n/v/diarrhea. Pt has thrown up multiple times as well as daily diarrhea whenever she eats x  Over 1 week. Pain not relieved with home remedies (repositioning, tylenol, muscle relaxer). Pt referred by pcp.

## 2021-10-23 ENCOUNTER — APPOINTMENT (OUTPATIENT)
Dept: GENERAL RADIOLOGY | Age: 31
End: 2021-10-23
Attending: EMERGENCY MEDICINE
Payer: MEDICAID

## 2021-10-23 ENCOUNTER — HOSPITAL ENCOUNTER (EMERGENCY)
Age: 31
Discharge: LEFT BEFORE DISCHARGE | End: 2021-10-23
Attending: EMERGENCY MEDICINE
Payer: MEDICAID

## 2021-10-23 ENCOUNTER — APPOINTMENT (OUTPATIENT)
Dept: CT IMAGING | Age: 31
End: 2021-10-23
Attending: EMERGENCY MEDICINE
Payer: MEDICAID

## 2021-10-23 VITALS
RESPIRATION RATE: 20 BRPM | HEART RATE: 78 BPM | TEMPERATURE: 98.3 F | DIASTOLIC BLOOD PRESSURE: 88 MMHG | OXYGEN SATURATION: 98 % | BODY MASS INDEX: 24.64 KG/M2 | SYSTOLIC BLOOD PRESSURE: 118 MMHG | WEIGHT: 157 LBS | HEIGHT: 67 IN

## 2021-10-23 DIAGNOSIS — R42 DIZZINESS: Primary | ICD-10-CM

## 2021-10-23 LAB
ALBUMIN SERPL-MCNC: 3.8 G/DL (ref 3.5–5)
ALBUMIN/GLOB SERPL: 1.2 {RATIO} (ref 1.1–2.2)
ALP SERPL-CCNC: 54 U/L (ref 45–117)
ALT SERPL-CCNC: 16 U/L (ref 12–78)
ANION GAP SERPL CALC-SCNC: 3 MMOL/L (ref 5–15)
APPEARANCE UR: CLEAR
AST SERPL W P-5'-P-CCNC: 19 U/L (ref 15–37)
BACTERIA URNS QL MICRO: NEGATIVE /HPF
BASOPHILS # BLD: 0.1 K/UL (ref 0–0.1)
BASOPHILS NFR BLD: 1 % (ref 0–1)
BILIRUB SERPL-MCNC: 0.4 MG/DL (ref 0.2–1)
BILIRUB UR QL: NEGATIVE
BUN SERPL-MCNC: 14 MG/DL (ref 6–20)
BUN/CREAT SERPL: 16 (ref 12–20)
CA-I BLD-MCNC: 9 MG/DL (ref 8.5–10.1)
CHLORIDE SERPL-SCNC: 111 MMOL/L (ref 97–108)
CO2 SERPL-SCNC: 26 MMOL/L (ref 21–32)
COLOR UR: ABNORMAL
CREAT SERPL-MCNC: 0.87 MG/DL (ref 0.55–1.02)
DIFFERENTIAL METHOD BLD: ABNORMAL
EOSINOPHIL # BLD: 1 K/UL (ref 0–0.4)
EOSINOPHIL NFR BLD: 10 % (ref 0–7)
ERYTHROCYTE [DISTWIDTH] IN BLOOD BY AUTOMATED COUNT: 13.4 % (ref 11.5–14.5)
GLOBULIN SER CALC-MCNC: 3.1 G/DL (ref 2–4)
GLUCOSE SERPL-MCNC: 90 MG/DL (ref 65–100)
GLUCOSE UR STRIP.AUTO-MCNC: NEGATIVE MG/DL
HCT VFR BLD AUTO: 40 % (ref 35–47)
HGB BLD-MCNC: 13.2 G/DL (ref 11.5–16)
HGB UR QL STRIP: ABNORMAL
IMM GRANULOCYTES # BLD AUTO: 0 K/UL (ref 0–0.04)
IMM GRANULOCYTES NFR BLD AUTO: 0 % (ref 0–0.5)
KETONES UR QL STRIP.AUTO: NEGATIVE MG/DL
LEUKOCYTE ESTERASE UR QL STRIP.AUTO: ABNORMAL
LIPASE SERPL-CCNC: 80 U/L (ref 73–393)
LYMPHOCYTES # BLD: 2.5 K/UL (ref 0.8–3.5)
LYMPHOCYTES NFR BLD: 25 % (ref 12–49)
MCH RBC QN AUTO: 29.3 PG (ref 26–34)
MCHC RBC AUTO-ENTMCNC: 33 G/DL (ref 30–36.5)
MCV RBC AUTO: 88.7 FL (ref 80–99)
MONOCYTES # BLD: 0.8 K/UL (ref 0–1)
MONOCYTES NFR BLD: 8 % (ref 5–13)
MUCOUS THREADS URNS QL MICRO: ABNORMAL /LPF
NEUTS SEG # BLD: 5.6 K/UL (ref 1.8–8)
NEUTS SEG NFR BLD: 56 % (ref 32–75)
NITRITE UR QL STRIP.AUTO: NEGATIVE
NRBC # BLD: 0 K/UL (ref 0–0.01)
NRBC BLD-RTO: 0 PER 100 WBC
PH UR STRIP: 6 [PH] (ref 5–8)
PLATELET # BLD AUTO: 225 K/UL (ref 150–400)
PMV BLD AUTO: 11.4 FL (ref 8.9–12.9)
POTASSIUM SERPL-SCNC: 3.7 MMOL/L (ref 3.5–5.1)
PROT SERPL-MCNC: 6.9 G/DL (ref 6.4–8.2)
PROT UR STRIP-MCNC: NEGATIVE MG/DL
RBC # BLD AUTO: 4.51 M/UL (ref 3.8–5.2)
RBC #/AREA URNS HPF: ABNORMAL /HPF (ref 0–5)
SODIUM SERPL-SCNC: 140 MMOL/L (ref 136–145)
SP GR UR REFRACTOMETRY: 1.01 (ref 1–1.03)
UA: UC IF INDICATED,UAUC: ABNORMAL
UROBILINOGEN UR QL STRIP.AUTO: 0.1 EU/DL (ref 0.1–1)
WBC # BLD AUTO: 9.9 K/UL (ref 3.6–11)
WBC URNS QL MICRO: ABNORMAL /HPF (ref 0–4)

## 2021-10-23 PROCEDURE — 96375 TX/PRO/DX INJ NEW DRUG ADDON: CPT

## 2021-10-23 PROCEDURE — 99284 EMERGENCY DEPT VISIT MOD MDM: CPT

## 2021-10-23 PROCEDURE — 36415 COLL VENOUS BLD VENIPUNCTURE: CPT

## 2021-10-23 PROCEDURE — 83690 ASSAY OF LIPASE: CPT

## 2021-10-23 PROCEDURE — 81001 URINALYSIS AUTO W/SCOPE: CPT

## 2021-10-23 PROCEDURE — 96374 THER/PROPH/DIAG INJ IV PUSH: CPT

## 2021-10-23 PROCEDURE — 93005 ELECTROCARDIOGRAM TRACING: CPT

## 2021-10-23 PROCEDURE — 80053 COMPREHEN METABOLIC PANEL: CPT

## 2021-10-23 PROCEDURE — 74011250636 HC RX REV CODE- 250/636: Performed by: EMERGENCY MEDICINE

## 2021-10-23 PROCEDURE — 85025 COMPLETE CBC W/AUTO DIFF WBC: CPT

## 2021-10-23 PROCEDURE — 71045 X-RAY EXAM CHEST 1 VIEW: CPT

## 2021-10-23 RX ORDER — DIPHENHYDRAMINE HYDROCHLORIDE 50 MG/ML
50 INJECTION, SOLUTION INTRAMUSCULAR; INTRAVENOUS ONCE
Status: DISCONTINUED | OUTPATIENT
Start: 2021-10-23 | End: 2021-10-24 | Stop reason: HOSPADM

## 2021-10-23 RX ORDER — METOCLOPRAMIDE HYDROCHLORIDE 5 MG/ML
10 INJECTION INTRAMUSCULAR; INTRAVENOUS
Status: DISCONTINUED | OUTPATIENT
Start: 2021-10-23 | End: 2021-10-24 | Stop reason: HOSPADM

## 2021-10-23 RX ORDER — KETOROLAC TROMETHAMINE 15 MG/ML
15 INJECTION, SOLUTION INTRAMUSCULAR; INTRAVENOUS ONCE
Status: COMPLETED | OUTPATIENT
Start: 2021-10-23 | End: 2021-10-23

## 2021-10-23 RX ORDER — ONDANSETRON 2 MG/ML
4 INJECTION INTRAMUSCULAR; INTRAVENOUS
Status: COMPLETED | OUTPATIENT
Start: 2021-10-23 | End: 2021-10-23

## 2021-10-23 RX ORDER — MECLIZINE HYDROCHLORIDE 25 MG/1
25 TABLET ORAL
Status: COMPLETED | OUTPATIENT
Start: 2021-10-23 | End: 2021-10-23

## 2021-10-23 RX ADMIN — SODIUM CHLORIDE 1000 ML: 9 INJECTION, SOLUTION INTRAVENOUS at 20:37

## 2021-10-23 RX ADMIN — MECLIZINE HYDROCHLORIDE 25 MG: 25 TABLET ORAL at 20:38

## 2021-10-23 RX ADMIN — ONDANSETRON 4 MG: 2 INJECTION INTRAMUSCULAR; INTRAVENOUS at 20:38

## 2021-10-23 RX ADMIN — KETOROLAC TROMETHAMINE 15 MG: 15 INJECTION, SOLUTION INTRAMUSCULAR; INTRAVENOUS at 20:38

## 2021-10-23 NOTE — ED PROVIDER NOTES
EMERGENCY DEPARTMENT HISTORY AND PHYSICAL EXAM        Date: 10/23/2021  Patient Name: Vidya Puckett    History of Presenting Illness     Chief Complaint   Patient presents with    Dizziness    Diarrhea     History Provided By: Patient    HPI: Vidya Puckett, 36 y.o. female with history of bipolar disorder, ADHD who presents with dizziness. States symptoms have been present since this morning. Dizziness has been constant and severe. She feels as if she may pass out as well. She did have a abdominal pain recently which is still present. It is the right upper quadrant. Been present for the last week. She was seen previously for this and had full work-up which was negative. She was told that she may have constipation was given MiraLAX. She thinks the MiraLAX is causing worsening diarrhea. PCP: Jhoan Benites MD    Current Facility-Administered Medications   Medication Dose Route Frequency Provider Last Rate Last Admin    sodium chloride 0.9 % bolus infusion 1,000 mL  1,000 mL IntraVENous ONCE Tahir Mann DO 1,000 mL/hr at 10/23/21 2037 1,000 mL at 10/23/21 2037    metoclopramide HCl (REGLAN) injection 10 mg  10 mg IntraVENous NOW Tahir Mann DO        diphenhydrAMINE (BENADRYL) injection 50 mg  50 mg IntraVENous ONCE Steve Pearl DO         Current Outpatient Medications   Medication Sig Dispense Refill    ondansetron (Zofran ODT) 4 mg disintegrating tablet Take 1 Tablet by mouth every eight (8) hours as needed for Nausea or Vomiting. 10 Tablet 1    polyethylene glycol (Miralax) 17 gram/dose powder Take 17 g by mouth daily. 1 capful twice daily until good bowel movement then 1 tablespoon with 8 oz of water daily 235 g 0    HYDROcodone-acetaminophen (Lorcet, HYDROcodone,) 5-325 mg per tablet Take 1 Tablet by mouth every six (6) hours as needed for Pain for up to 3 days. Max Daily Amount: 4 Tablets.  12 Tablet 0    butalbital-acetaminophen-caff (FIORICET) -40 mg per capsule Take 1 Cap by mouth every four (4) hours as needed for Headache. 10 Cap 0    ALPRAZolam (XANAX) 1 mg tablet TK 1 T PO  TID PRN  1       Past History     Past Medical History:  Past Medical History:   Diagnosis Date    ADHD     Bipolar disorder ([address]     Depression     PTSD (post-traumatic stress disorder)        Past Surgical History:  Past Surgical History:   Procedure Laterality Date    HX PELVIC LAPAROSCOPY      Endometriosis    HX TONSILLECTOMY         Family History:  Reviewed and noncontributory    Social History:  Social History     Tobacco Use    Smoking status: Current Every Day Smoker     Packs/day: 1.00     Types: Cigarettes    Smokeless tobacco: Never Used   Substance Use Topics    Alcohol use: Not Currently    Drug use: Never       Allergies: Allergies   Allergen Reactions    Latex Hives    Codeine Rash    Demerol [Meperidine] Itching    Penicillins Hives           Review of Systems   Review of Systems   Constitutional: Negative for fever. HENT: Negative for congestion. Eyes: Negative for visual disturbance. Respiratory: Negative for shortness of breath. Cardiovascular: Negative for chest pain. Gastrointestinal: Positive for abdominal pain. Genitourinary: Negative for dysuria. Musculoskeletal: Negative for arthralgias. Skin: Negative for rash. Neurological: Positive for dizziness and light-headedness. Negative for headaches. Physical Exam   Constitutional: No acute distress. Well-nourished. Skin: No rash. ENT: No rhinorrhea. No cough. Head is normocephalic and atraumatic. Eye: No proptosis or conjunctival injections. Respiratory: No apparent respiratory distress. Lung sounds are clear. Cardiovascular: Regular rate and rhythm. No murmurs. Gastrointestinal: Nondistended. Abdomen is minimally tender in the upper region. No rebound or guarding. Musculoskeletal: No obvious bony deformities. Psychiatric: Cooperative. Appropriate mood and affect.     Diagnostic Study Results     Labs -     Recent Results (from the past 24 hour(s))   LIPASE    Collection Time: 10/23/21  8:45 PM   Result Value Ref Range    Lipase 80 73 - 393 U/L   CBC WITH AUTOMATED DIFF    Collection Time: 10/23/21  8:45 PM   Result Value Ref Range    WBC 9.9 3.6 - 11.0 K/uL    RBC 4.51 3.80 - 5.20 M/uL    HGB 13.2 11.5 - 16.0 g/dL    HCT 40.0 35.0 - 47.0 %    MCV 88.7 80.0 - 99.0 FL    MCH 29.3 26.0 - 34.0 PG    MCHC 33.0 30.0 - 36.5 g/dL    RDW 13.4 11.5 - 14.5 %    PLATELET 287 533 - 407 K/uL    MPV 11.4 8.9 - 12.9 FL    NRBC 0.0 0.0  WBC    ABSOLUTE NRBC 0.00 0.00 - 0.01 K/uL    NEUTROPHILS 56 32 - 75 %    LYMPHOCYTES 25 12 - 49 %    MONOCYTES 8 5 - 13 %    EOSINOPHILS 10 (H) 0 - 7 %    BASOPHILS 1 0 - 1 %    IMMATURE GRANULOCYTES 0 0 - 0.5 %    ABS. NEUTROPHILS 5.6 1.8 - 8.0 K/UL    ABS. LYMPHOCYTES 2.5 0.8 - 3.5 K/UL    ABS. MONOCYTES 0.8 0.0 - 1.0 K/UL    ABS. EOSINOPHILS 1.0 (H) 0.0 - 0.4 K/UL    ABS. BASOPHILS 0.1 0.0 - 0.1 K/UL    ABS. IMM. GRANS. 0.0 0.00 - 0.04 K/UL    DF AUTOMATED     METABOLIC PANEL, COMPREHENSIVE    Collection Time: 10/23/21  8:45 PM   Result Value Ref Range    Sodium 140 136 - 145 mmol/L    Potassium 3.7 3.5 - 5.1 mmol/L    Chloride 111 (H) 97 - 108 mmol/L    CO2 26 21 - 32 mmol/L    Anion gap 3 (L) 5 - 15 mmol/L    Glucose 90 65 - 100 mg/dL    BUN 14 6 - 20 mg/dL    Creatinine 0.87 0.55 - 1.02 mg/dL    BUN/Creatinine ratio 16 12 - 20      GFR est AA >60 >60 ml/min/1.73m2    GFR est non-AA >60 >60 ml/min/1.73m2    Calcium 9.0 8.5 - 10.1 mg/dL    Bilirubin, total 0.4 0.2 - 1.0 mg/dL    AST (SGOT) 19 15 - 37 U/L    ALT (SGPT) 16 12 - 78 U/L    Alk.  phosphatase 54 45 - 117 U/L    Protein, total 6.9 6.4 - 8.2 g/dL    Albumin 3.8 3.5 - 5.0 g/dL    Globulin 3.1 2.0 - 4.0 g/dL    A-G Ratio 1.2 1.1 - 2.2     URINALYSIS W/ REFLEX CULTURE    Collection Time: 10/23/21  8:55 PM    Specimen: Urine   Result Value Ref Range    Color Yellow/Straw      Appearance Clear Clear Specific gravity 1.008 1.003 - 1.030      pH (UA) 6.0 5.0 - 8.0      Protein Negative Negative mg/dL    Glucose Negative Negative mg/dL    Ketone Negative Negative mg/dL    Bilirubin Negative Negative      Blood Moderate (A) Negative      Urobilinogen 0.1 0.1 - 1.0 EU/dL    Nitrites Negative Negative      Leukocyte Esterase Moderate (A) Negative      UA:UC IF INDICATED Culture not indicated by UA result Culture not indicated by UA result      WBC 0-4 0 - 4 /hpf    RBC 0-5 0 - 5 /hpf    Bacteria Negative Negative /hpf    Mucus Trace /lpf       Radiologic Studies -   XR CHEST SNGL V   Final Result        CT Results  (Last 48 hours)    None        CXR Results  (Last 48 hours)               10/23/21 2013  XR CHEST SNGL V Final result    Narrative:  One view. Comparison 4 November, 2020. No acute process is evident. Lungs are clear. No edema or infiltrate. Normal   cardiomediastinal silhouette with normal cardiac silhouette size. Medical Decision Making and ED Course     I reviewed the available vital signs, nursing notes, past medical history, past surgical history, family history, and social history. Vital Signs - Reviewed the patient's vital signs. Patient Vitals for the past 12 hrs:   Temp Pulse Resp BP SpO2   10/23/21 2042  78 20 118/88 98 %   10/23/21 1919 98.3 °F (36.8 °C) 87 16 120/76 99 %     EKG interpretation: Obtained on 10/23/2021 at 2052. Read at 2100. Normal sinus rhythm at rate of 65 bpm.  Normal WI interval, QRS duration, QTc interval.  No ST segment abnormalities. Normal axis. Medical Decision Making:   Presented with dizziness. The differential diagnosis is vertigo, gastroenteritis, gastroparesis, anxiety, arrhythmia, TIA, stroke. Low suspicion for central cause. I have suspicion that this is peripheral vertigo. Unclear etiology of her abdominal pain. She was seen 3 days ago and had full work-up which was negative.   I did give her IV fluids and Toradol as well as meclizine. She did not have any improvement of her symptoms. I ordered a CT scan as well as Reglan and Benadryl. Patient decided to elope after I ordered these. I had just reevaluated her and mentioned that she would likely need to see specialist for her symptoms. She felt as if nothing was being done and decided she did not want to stay according to the nurse. I tried to find the patient to tell her to stay for the CT scan or to at least discuss follow-up with her but she had already eloped. Disposition     Eloped      DISCHARGE PLAN:  1. Current Discharge Medication List      CONTINUE these medications which have NOT CHANGED    Details   ondansetron (Zofran ODT) 4 mg disintegrating tablet Take 1 Tablet by mouth every eight (8) hours as needed for Nausea or Vomiting. Qty: 10 Tablet, Refills: 1      polyethylene glycol (Miralax) 17 gram/dose powder Take 17 g by mouth daily. 1 capful twice daily until good bowel movement then 1 tablespoon with 8 oz of water daily  Qty: 235 g, Refills: 0      HYDROcodone-acetaminophen (Lorcet, HYDROcodone,) 5-325 mg per tablet Take 1 Tablet by mouth every six (6) hours as needed for Pain for up to 3 days. Max Daily Amount: 4 Tablets. Qty: 12 Tablet, Refills: 0    Associated Diagnoses: Abdominal pain, right lower quadrant      butalbital-acetaminophen-caff (FIORICET) -40 mg per capsule Take 1 Cap by mouth every four (4) hours as needed for Headache. Qty: 10 Cap, Refills: 0      ALPRAZolam (XANAX) 1 mg tablet TK 1 T PO  TID PRN  Refills: 1           2. Follow-up Information     Follow up With Specialties Details Why [address] EMERGENCY DEPT Emergency Medicine Go today As soon as possible if symptoms worsen [facility] [id number] [phone]    Primary care doctor  Schedule an appointment as soon as possible for a visit in 3 days          3. Return to ED if worse     Diagnosis     Clinical impression:   1.  Dizziness Attestation:  Please note that this dictation was completed with [employer], the computer voice recognition software. Quite often unanticipated grammatical, syntax, homophones, and other interpretive errors are inadvertently transcribed by the computer software. Please disregard these errors. Please excuse any errors that have escaped final proofreading. Thank you.   Seema Petty, DO

## 2021-10-23 NOTE — ED TRIAGE NOTES
Pt c/o abd and back pain that began 2 wks ago. States was seen earlier this week for the same. Started taking miralax 2-3 days ago and has been having diarrhea since then.  Today with some dizziness

## 2021-10-24 LAB
ATRIAL RATE: 65 BPM
CALCULATED P AXIS, ECG09: 72 DEGREES
CALCULATED R AXIS, ECG10: 73 DEGREES
CALCULATED T AXIS, ECG11: 49 DEGREES
DIAGNOSIS, 93000: NORMAL
P-R INTERVAL, ECG05: 136 MS
Q-T INTERVAL, ECG07: 420 MS
QRS DURATION, ECG06: 92 MS
QTC CALCULATION (BEZET), ECG08: 436 MS
VENTRICULAR RATE, ECG03: 65 BPM

## 2021-10-24 NOTE — ED NOTES
Pt upset and states she just wants her IV out. Pt stated Dr told her if everything was normal she was going to be discharged. Nurse informed pt. Provider is going to be giving her more medication as well as a Ct. And she states well if is normal I will be discharged so I am just going to leave. Nurse removed IV and informed provider. PT ANO x 4. NAD noted. Accompanied by 1 adult male.

## 2021-12-06 ENCOUNTER — HOSPITAL ENCOUNTER (OUTPATIENT)
Dept: PREADMISSION TESTING | Age: 31
Discharge: HOME | End: 2021-12-06
Payer: MEDICAID

## 2021-12-06 LAB — SARS-COV-2, COV2: NORMAL

## 2021-12-06 PROCEDURE — U0005 INFEC AGEN DETEC AMPLI PROBE: HCPCS

## 2021-12-07 LAB
SARS-COV-2, XPLCVT: NOT DETECTED
SOURCE, COVRS: NORMAL

## 2021-12-09 ENCOUNTER — ANESTHESIA EVENT (OUTPATIENT)
Dept: ENDOSCOPY | Age: 31
End: 2021-12-09
Payer: MEDICAID

## 2021-12-09 ENCOUNTER — HOSPITAL ENCOUNTER (OUTPATIENT)
Age: 31
Setting detail: OUTPATIENT SURGERY
Discharge: HOME | End: 2021-12-09
Attending: INTERNAL MEDICINE | Admitting: INTERNAL MEDICINE
Payer: MEDICAID

## 2021-12-09 ENCOUNTER — APPOINTMENT (OUTPATIENT)
Dept: ENDOSCOPY | Age: 31
End: 2021-12-09
Attending: INTERNAL MEDICINE
Payer: MEDICAID

## 2021-12-09 ENCOUNTER — ANESTHESIA (OUTPATIENT)
Dept: ENDOSCOPY | Age: 31
End: 2021-12-09
Payer: MEDICAID

## 2021-12-09 ENCOUNTER — TRANSCRIBE ORDER (OUTPATIENT)
Dept: SCHEDULING | Age: 31
End: 2021-12-09

## 2021-12-09 VITALS
HEART RATE: 62 BPM | BODY MASS INDEX: 23.79 KG/M2 | WEIGHT: 157 LBS | HEIGHT: 68 IN | OXYGEN SATURATION: 99 % | TEMPERATURE: 97.4 F | RESPIRATION RATE: 16 BRPM | DIASTOLIC BLOOD PRESSURE: 75 MMHG | SYSTOLIC BLOOD PRESSURE: 117 MMHG

## 2021-12-09 DIAGNOSIS — R55 SYNCOPE: Primary | ICD-10-CM

## 2021-12-09 LAB
H PYLORI FROM TISSUE: NEGATIVE
HCG UR QL: NEGATIVE
KIT LOT NO., HCLOLOT: NORMAL
NEGATIVE CONTROL: NEGATIVE
POSITIVE CONTROL: POSITIVE

## 2021-12-09 PROCEDURE — 76040000007: Performed by: INTERNAL MEDICINE

## 2021-12-09 PROCEDURE — 77030021593 HC FCPS BIOP ENDOSC BSC -A: Performed by: INTERNAL MEDICINE

## 2021-12-09 PROCEDURE — 2709999900 HC NON-CHARGEABLE SUPPLY: Performed by: INTERNAL MEDICINE

## 2021-12-09 PROCEDURE — 87077 CULTURE AEROBIC IDENTIFY: CPT | Performed by: INTERNAL MEDICINE

## 2021-12-09 PROCEDURE — 74011000250 HC RX REV CODE- 250: Performed by: NURSE ANESTHETIST, CERTIFIED REGISTERED

## 2021-12-09 PROCEDURE — 88305 TISSUE EXAM BY PATHOLOGIST: CPT

## 2021-12-09 PROCEDURE — 81025 URINE PREGNANCY TEST: CPT

## 2021-12-09 PROCEDURE — 74011250636 HC RX REV CODE- 250/636: Performed by: INTERNAL MEDICINE

## 2021-12-09 PROCEDURE — 88312 SPECIAL STAINS GROUP 1: CPT

## 2021-12-09 PROCEDURE — 76060000032 HC ANESTHESIA 0.5 TO 1 HR: Performed by: INTERNAL MEDICINE

## 2021-12-09 PROCEDURE — 74011250636 HC RX REV CODE- 250/636: Performed by: NURSE ANESTHETIST, CERTIFIED REGISTERED

## 2021-12-09 RX ORDER — SODIUM CHLORIDE, SODIUM LACTATE, POTASSIUM CHLORIDE, CALCIUM CHLORIDE 600; 310; 30; 20 MG/100ML; MG/100ML; MG/100ML; MG/100ML
50 INJECTION, SOLUTION INTRAVENOUS CONTINUOUS
Status: DISCONTINUED | OUTPATIENT
Start: 2021-12-09 | End: 2021-12-09 | Stop reason: HOSPADM

## 2021-12-09 RX ORDER — SODIUM CHLORIDE, SODIUM LACTATE, POTASSIUM CHLORIDE, CALCIUM CHLORIDE 600; 310; 30; 20 MG/100ML; MG/100ML; MG/100ML; MG/100ML
INJECTION, SOLUTION INTRAVENOUS
Status: DISCONTINUED | OUTPATIENT
Start: 2021-12-09 | End: 2021-12-09 | Stop reason: HOSPADM

## 2021-12-09 RX ORDER — FENTANYL CITRATE 50 UG/ML
INJECTION, SOLUTION INTRAMUSCULAR; INTRAVENOUS
Status: COMPLETED
Start: 2021-12-09 | End: 2021-12-09

## 2021-12-09 RX ORDER — SODIUM CHLORIDE 0.9 % (FLUSH) 0.9 %
5-40 SYRINGE (ML) INJECTION AS NEEDED
Status: CANCELLED | OUTPATIENT
Start: 2021-12-09

## 2021-12-09 RX ORDER — MIDAZOLAM HYDROCHLORIDE 1 MG/ML
0.5 INJECTION, SOLUTION INTRAMUSCULAR; INTRAVENOUS
Status: CANCELLED | OUTPATIENT
Start: 2021-12-09

## 2021-12-09 RX ORDER — PROPOFOL 10 MG/ML
INJECTION, EMULSION INTRAVENOUS AS NEEDED
Status: DISCONTINUED | OUTPATIENT
Start: 2021-12-09 | End: 2021-12-09 | Stop reason: HOSPADM

## 2021-12-09 RX ORDER — LIDOCAINE HYDROCHLORIDE 10 MG/ML
0.1 INJECTION, SOLUTION EPIDURAL; INFILTRATION; INTRACAUDAL; PERINEURAL AS NEEDED
Status: CANCELLED | OUTPATIENT
Start: 2021-12-09

## 2021-12-09 RX ORDER — MIDAZOLAM HYDROCHLORIDE 1 MG/ML
INJECTION, SOLUTION INTRAMUSCULAR; INTRAVENOUS AS NEEDED
Status: DISCONTINUED | OUTPATIENT
Start: 2021-12-09 | End: 2021-12-09 | Stop reason: HOSPADM

## 2021-12-09 RX ORDER — LIDOCAINE HYDROCHLORIDE 20 MG/ML
INJECTION, SOLUTION EPIDURAL; INFILTRATION; INTRACAUDAL; PERINEURAL
Status: COMPLETED
Start: 2021-12-09 | End: 2021-12-09

## 2021-12-09 RX ORDER — MIDAZOLAM HYDROCHLORIDE 1 MG/ML
1 INJECTION, SOLUTION INTRAMUSCULAR; INTRAVENOUS AS NEEDED
Status: CANCELLED | OUTPATIENT
Start: 2021-12-09

## 2021-12-09 RX ORDER — ONDANSETRON 2 MG/ML
4 INJECTION INTRAMUSCULAR; INTRAVENOUS AS NEEDED
Status: CANCELLED | OUTPATIENT
Start: 2021-12-09

## 2021-12-09 RX ORDER — SODIUM CHLORIDE 0.9 % (FLUSH) 0.9 %
5-40 SYRINGE (ML) INJECTION EVERY 8 HOURS
Status: CANCELLED | OUTPATIENT
Start: 2021-12-09

## 2021-12-09 RX ORDER — FENTANYL CITRATE 50 UG/ML
INJECTION, SOLUTION INTRAMUSCULAR; INTRAVENOUS AS NEEDED
Status: DISCONTINUED | OUTPATIENT
Start: 2021-12-09 | End: 2021-12-09 | Stop reason: HOSPADM

## 2021-12-09 RX ORDER — PROPOFOL 10 MG/ML
INJECTION, EMULSION INTRAVENOUS
Status: COMPLETED
Start: 2021-12-09 | End: 2021-12-09

## 2021-12-09 RX ORDER — EPHEDRINE SULFATE/0.9% NACL/PF 50 MG/5 ML
5 SYRINGE (ML) INTRAVENOUS AS NEEDED
Status: CANCELLED | OUTPATIENT
Start: 2021-12-09

## 2021-12-09 RX ORDER — DIPHENHYDRAMINE HYDROCHLORIDE 50 MG/ML
12.5 INJECTION, SOLUTION INTRAMUSCULAR; INTRAVENOUS AS NEEDED
Status: CANCELLED | OUTPATIENT
Start: 2021-12-09 | End: 2021-12-09

## 2021-12-09 RX ORDER — MIDAZOLAM HYDROCHLORIDE 1 MG/ML
INJECTION, SOLUTION INTRAMUSCULAR; INTRAVENOUS
Status: COMPLETED
Start: 2021-12-09 | End: 2021-12-09

## 2021-12-09 RX ORDER — LIDOCAINE HYDROCHLORIDE 20 MG/ML
INJECTION, SOLUTION EPIDURAL; INFILTRATION; INTRACAUDAL; PERINEURAL AS NEEDED
Status: DISCONTINUED | OUTPATIENT
Start: 2021-12-09 | End: 2021-12-09 | Stop reason: HOSPADM

## 2021-12-09 RX ADMIN — PROPOFOL 50 MG: 10 INJECTION, EMULSION INTRAVENOUS at 13:42

## 2021-12-09 RX ADMIN — SODIUM CHLORIDE, POTASSIUM CHLORIDE, SODIUM LACTATE AND CALCIUM CHLORIDE: 600; 310; 30; 20 INJECTION, SOLUTION INTRAVENOUS at 13:27

## 2021-12-09 RX ADMIN — PROPOFOL 50 MG: 10 INJECTION, EMULSION INTRAVENOUS at 13:38

## 2021-12-09 RX ADMIN — MIDAZOLAM HYDROCHLORIDE 2 MG: 2 INJECTION, SOLUTION INTRAMUSCULAR; INTRAVENOUS at 13:27

## 2021-12-09 RX ADMIN — FENTANYL CITRATE 50 MCG: 50 INJECTION, SOLUTION INTRAMUSCULAR; INTRAVENOUS at 13:43

## 2021-12-09 RX ADMIN — PROPOFOL 50 MG: 10 INJECTION, EMULSION INTRAVENOUS at 13:34

## 2021-12-09 RX ADMIN — SODIUM CHLORIDE, POTASSIUM CHLORIDE, SODIUM LACTATE AND CALCIUM CHLORIDE 50 ML/HR: 600; 310; 30; 20 INJECTION, SOLUTION INTRAVENOUS at 11:56

## 2021-12-09 RX ADMIN — FENTANYL CITRATE 50 MCG: 50 INJECTION, SOLUTION INTRAMUSCULAR; INTRAVENOUS at 13:33

## 2021-12-09 RX ADMIN — LIDOCAINE HYDROCHLORIDE 100 MG: 20 INJECTION, SOLUTION EPIDURAL; INFILTRATION; INTRACAUDAL; PERINEURAL at 13:33

## 2021-12-09 NOTE — DISCHARGE INSTRUCTIONS
Any issues with procedure, such as pain, bleeding, fever, please call Dr Zeyad Trejo office at day time, or [phone] to page me, or go to ER at other time. Follow up in 3 months.

## 2021-12-09 NOTE — ANESTHESIA POSTPROCEDURE EVALUATION
Procedure(s):  COLONOSCOPY WITH POSSIBLE BIPSY AND UPPER ENDOSCOPY  ESOPHAGOGASTRODUODENOSCOPY (EGD)  ESOPHAGOGASTRODUODENAL (EGD) BIOPSY. total IV anesthesia, MAC    Anesthesia Post Evaluation      Multimodal analgesia: multimodal analgesia used between 6 hours prior to anesthesia start to PACU discharge  Patient location during evaluation: bedside  Patient participation: complete - patient cannot participate  Level of consciousness: sleepy but conscious  Pain score: 0  Airway patency: patent  Anesthetic complications: no  Cardiovascular status: acceptable, hemodynamically stable and stable  Respiratory status: acceptable, spontaneous ventilation, unassisted and nasal cannula  Hydration status: acceptable  Post anesthesia nausea and vomiting:  none  Final Post Anesthesia Temperature Assessment:  Normothermia (36.0-37.5 degrees C)      INITIAL Post-op Vital signs: No vitals data found for the desired time range.

## 2021-12-09 NOTE — ANESTHESIA PREPROCEDURE EVALUATION
Relevant Problems   No relevant active problems       Anesthetic History   No history of anesthetic complications            Review of Systems / Medical History  Patient summary reviewed, nursing notes reviewed and pertinent labs reviewed    Pulmonary    COPD: mild    Sleep apnea: No treatment  Smoker (1/2ppd x >22)  Asthma : well controlled       Neuro/Psych         Psychiatric history     Cardiovascular  Within defined limits                Exercise tolerance: >4 METS     GI/Hepatic/Renal               Comments: Abdominal pain and digestive issues Endo/Other  Within defined limits           Other Findings              Physical Exam    Airway  Mallampati: I  TM Distance: 4 - 6 cm  Neck ROM: normal range of motion   Mouth opening: Normal     Cardiovascular    Rhythm: regular  Rate: normal         Dental  No notable dental hx       Pulmonary  Breath sounds clear to auscultation               Abdominal  GI exam deferred       Other Findings            Anesthetic Plan    ASA: 2  Anesthesia type: total IV anesthesia and MAC          Induction: Intravenous  Anesthetic plan and risks discussed with: Patient and Family

## 2021-12-09 NOTE — PROGRESS NOTES
Patient alert and oriented x4. Vital signs stable on room air. Patient discharged home per physicians order. Patient verbalizes understanding of discharge instructions. Patient transported to front entrance of hospital via wheelchair with family member present to transport patient home via private vehicle.

## 2021-12-10 ENCOUNTER — TRANSCRIBE ORDER (OUTPATIENT)
Dept: SCHEDULING | Age: 31
End: 2021-12-10

## 2021-12-10 DIAGNOSIS — K76.9 LIVER LESION: Primary | ICD-10-CM

## 2022-01-25 ENCOUNTER — HOSPITAL ENCOUNTER (OUTPATIENT)
Dept: NON INVASIVE DIAGNOSTICS | Age: 32
Discharge: HOME | End: 2022-01-25
Payer: MEDICAID

## 2022-01-25 ENCOUNTER — HOSPITAL ENCOUNTER (OUTPATIENT)
Dept: ULTRASOUND IMAGING | Age: 32
Discharge: HOME | End: 2022-01-25
Payer: MEDICAID

## 2022-01-25 DIAGNOSIS — K76.9 LIVER LESION: ICD-10-CM

## 2022-01-25 DIAGNOSIS — R55 SYNCOPE: ICD-10-CM

## 2022-01-25 DIAGNOSIS — R55 SYNCOPE, UNSPECIFIED SYNCOPE TYPE: ICD-10-CM

## 2022-01-25 PROCEDURE — 93880 EXTRACRANIAL BILAT STUDY: CPT

## 2022-01-25 PROCEDURE — 76705 ECHO EXAM OF ABDOMEN: CPT

## 2022-01-26 LAB
LEFT ARM BP: 110 MMHG
LEFT CCA DIST DIAS: 29 CM/S
LEFT CCA DIST SYS: 95.5 CM/S
LEFT CCA PROX DIAS: 26.9 CM/S
LEFT CCA PROX SYS: 131 CM/S
LEFT ECA DIAS: 22.9 CM/S
LEFT ECA SYS: 81.7 CM/S
LEFT ICA DIST DIAS: 50.4 CM/S
LEFT ICA DIST SYS: 81.3 CM/S
LEFT ICA PROX DIAS: 58 CM/S
LEFT ICA PROX SYS: 90.9 CM/S
LEFT ICA/CCA SYS: 0.95
LEFT VERTEBRAL DIAS: 13.8 CM/S
LEFT VERTEBRAL SYS: 51.1 CM/S
RIGHT ARM BP: 115 MMHG
RIGHT CCA DIST DIAS: 22.2 CM/S
RIGHT CCA DIST SYS: 100 CM/S
RIGHT CCA PROX DIAS: 25.2 CM/S
RIGHT CCA PROX SYS: 124 CM/S
RIGHT ECA DIAS: 10.7 CM/S
RIGHT ECA SYS: 87.1 CM/S
RIGHT ICA DIST DIAS: 60.7 CM/S
RIGHT ICA DIST SYS: 97.8 CM/S
RIGHT ICA PROX DIAS: 35.1 CM/S
RIGHT ICA PROX SYS: 97.8 CM/S
RIGHT ICA/CCA SYS: 0.98
RIGHT VERTEBRAL DIAS: 20.4 CM/S
RIGHT VERTEBRAL SYS: 66.6 CM/S
VAS LEFT SUBCLAVIAN PROX EDV: 0 CM/S
VAS LEFT SUBCLAVIAN PROX PSV: 113 CM/S
VAS RIGHT SUBCLAVIAN PROX EDV: 0 CM/S
VAS RIGHT SUBCLAVIAN PROX PSV: 91.4 CM/S

## 2022-01-26 PROCEDURE — 93880 EXTRACRANIAL BILAT STUDY: CPT | Performed by: SURGERY

## 2022-02-18 NOTE — PROGRESS NOTES
[address]  Ph: [phone]  Fax: [phone]    Medicaid Discharge Summary 2-15    Patient name: Leandro Babin  : 1990  Provider#: [id number]  Referral source: Rissa Jang MD      Medical/Treatment Diagnosis: Leg pain, bilateral [W66.261, M79.605]  Complex regional pain syndrome i of unspecified lower limb [G90.529]     Prior Hospitalization: see medical history     Comorbidities: See Plan of Care  Prior Level of Function: See Plan of Care  Medications: Verified on Patient Summary List  Start of Care: 21   Onset Date:(see initial plan of care)  Visits from Start of Care: 3  Missed Visits: 8  Certification Period : 21 to 21    Assessment/Summary of care/GOALS:   Pt was seen for 3 follow up visits. Pt expressed discouragement with lack of progress. She cancelled last 7 scheduled visits due to not feeling well. Pt was last seen for PT 9/10/21 so will be discharged at this time. Full goal assessment not performed due to pt self-discharge. Thank you for this referral.    Patient Goal (s): \"To be cristina to walk right without any pain\". []?? Met []? ? Not met []? ? Partially met   Short Term Goals: To be accomplished in 2-4  treatments. 1. Patient will progress with her HEP to progress with POC. [x]? Met []? Not met []? Partially met  9/10 reports compliance  2. Patient pain level with reduce to < or = to 4/10 to improve mobility. []? Met []? Not met []? Partially met   3. Patient TUG score will reduce by 2-3 point to reduce her fall risk. []? Met []? Not met []? Partially met   4. Patient will be able to perform a SLS x 5 sec on B LEs to improve staility. []? Met []? Not met []? Partially met   5. Patient will gain knee flexion and knee extension AROM by 2-5 deg in each direction. []? Met []? Not met []? Partially met    Long Term Goals: To be accomplished in 12 treatments.   1. Patient will be able to ambulate in community with LRAD and less of an antalgic gait pattern. []? Met []? Not met []? Partially met   2. Patient will be able to negotiate 4(6'') steps with SOS pattern and LRAD independently. []? Met []? Not met []? Partially met   3. Patient will be able to get in/out of bed and shower with more ease and less of knee pain. []? Met []? Not met []? Partially met     Ampac Score:  Initial evaluation    RECOMMENDATIONS:  [x]Discontinue therapy:   []Patient has reached or is progressing toward set goals and is independent with HEP   [x]Patient is non-compliant or has abdicated   []Due to lack of appreciable progress towards set goals   []Patient was hospitalized or suffered illness that impacted ability to continue therapy   []Other:  Simeon Dee, PT, DPT 2/18/2022   Retain this original for your records. If you are unable to process this request in 24 hours, please contact our office.   ________________________________________________________________________  NOTE TO PHYSICIAN:  Please complete the following and FAX to: Nick Lockett :  Fax: [phone]   ____ I have read the above report and request that my patient be discharged from therapy.     Physician's Signature:_____________________________________________________ Date:_____________Time:_____________     Nicholas Quintero MD

## 2022-05-12 ENCOUNTER — HOSPITAL ENCOUNTER (EMERGENCY)
Age: 32
Discharge: HOME | End: 2022-05-13
Attending: STUDENT IN AN ORGANIZED HEALTH CARE EDUCATION/TRAINING PROGRAM
Payer: MEDICAID

## 2022-05-12 DIAGNOSIS — K08.89 PAIN, DENTAL: Primary | ICD-10-CM

## 2022-05-12 PROCEDURE — 99283 EMERGENCY DEPT VISIT LOW MDM: CPT

## 2022-05-13 VITALS
WEIGHT: 167 LBS | BODY MASS INDEX: 26.21 KG/M2 | HEIGHT: 67 IN | OXYGEN SATURATION: 98 % | SYSTOLIC BLOOD PRESSURE: 112 MMHG | RESPIRATION RATE: 16 BRPM | DIASTOLIC BLOOD PRESSURE: 73 MMHG | HEART RATE: 72 BPM | TEMPERATURE: 99 F

## 2022-05-13 PROCEDURE — 74011250637 HC RX REV CODE- 250/637: Performed by: STUDENT IN AN ORGANIZED HEALTH CARE EDUCATION/TRAINING PROGRAM

## 2022-05-13 RX ORDER — ONDANSETRON 4 MG/1
4 TABLET, FILM COATED ORAL
Qty: 20 TABLET | Refills: 0 | Status: SHIPPED | OUTPATIENT
Start: 2022-05-13

## 2022-05-13 RX ORDER — ONDANSETRON 4 MG/1
4 TABLET, ORALLY DISINTEGRATING ORAL
Status: COMPLETED | OUTPATIENT
Start: 2022-05-13 | End: 2022-05-13

## 2022-05-13 RX ORDER — OXYCODONE HYDROCHLORIDE 5 MG/1
5 TABLET ORAL ONCE
Status: COMPLETED | OUTPATIENT
Start: 2022-05-13 | End: 2022-05-13

## 2022-05-13 RX ORDER — OXYCODONE HYDROCHLORIDE 5 MG/1
5 TABLET ORAL
Qty: 12 TABLET | Refills: 0 | Status: SHIPPED | OUTPATIENT
Start: 2022-05-13 | End: 2022-05-16

## 2022-05-13 RX ORDER — CLINDAMYCIN HYDROCHLORIDE 300 MG/1
300 CAPSULE ORAL 4 TIMES DAILY
Qty: 20 CAPSULE | Refills: 0 | Status: SHIPPED | OUTPATIENT
Start: 2022-05-13 | End: 2022-05-18

## 2022-05-13 RX ADMIN — ONDANSETRON 4 MG: 4 TABLET, ORALLY DISINTEGRATING ORAL at 00:45

## 2022-05-13 RX ADMIN — OXYCODONE 5 MG: 5 TABLET ORAL at 00:45

## 2022-05-13 NOTE — ED PROVIDER NOTES
[facility]  EMERGENCY DEPARTMENT ENCOUNTER NOTE    Date: 5/12/2022  Patient Name: Lakia Martinez    History of Presenting Illness     Chief Complaint   Patient presents with    Dental Pain     HPI: Lakia Martinez, 36 y.o. female with a past medical history and outpatient medications as listed and reviewed below  presents for dental pain. Patient reports a 4 day history of dental pain. It is localized to the molar after she had tooth extraction. Pain is described as dull, aching, and progressively worsening. Patient reports that she had 8 teeth extracted on Monday. Patient denies sublingual fullness, shortness of breath, stridor, fevers, chills, vomiting, neck stiffness, and inability to tolerate PO intake. Medications taken prior to presentation: Norco, ibuprofen.     Medical History   I reviewed the medical, surgical, family, and social history, as well as allergies:    PCP: Rosa Benites MD    Past Medical History:  Past Medical History:   Diagnosis Date    ADHD     Asthma     [facility]     Chronic obstructive pulmonary disease ([facility]     Depression     PTSD (post-traumatic stress disorder)     Sleep apnea      Past Surgical History:  Past Surgical History:   Procedure Laterality Date    COLONOSCOPY N/A 12/9/2021    COLONOSCOPY WITH POSSIBLE BIPSY AND UPPER ENDOSCOPY performed by Leslie Restrepo MD at [address] HX PELVIC LAPAROSCOPY      Endometriosis    HX TONSILLECTOMY       Current Outpatient Medications:  Current Outpatient Medications   Medication Instructions    ALPRAZolam (XANAX) 1 mg tablet TK 1 T PO  TID PRN    butalbital-acetaminophen-caff (FIORICET) -40 mg per capsule 1 Capsule, Oral, EVERY 4 HOURS AS NEEDED    clindamycin (CLEOCIN) 300 mg, Oral, 4 TIMES DAILY    ondansetron (ZOFRAN ODT) 4 mg, Oral, EVERY 8 HOURS AS NEEDED    ondansetron hcl (ZOFRAN) 4 mg, Oral, EVERY 8 HOURS AS NEEDED    ondansetron hcl (ZOFRAN) 4 mg, Oral, EVERY 8 HOURS AS NEEDED    oxyCODONE IR (ROXICODONE) 5 mg, Oral, EVERY 6 HOURS AS NEEDED    polyethylene glycol (MIRALAX) 17 g, Oral, DAILY, 1 capful twice daily until good bowel movement then 1 tablespoon with 8 oz of water daily      Family History:  Family History   Problem Relation Age of Onset    Cancer Mother     Heart Disease Father      Social History:  Social History     Tobacco Use    Smoking status: Current Every Day Smoker     Packs/day: 0.50     Types: Cigarettes    Smokeless tobacco: Never Used   Substance Use Topics    Alcohol use: Not Currently    Drug use: Never     Allergies: Allergies   Allergen Reactions    Latex Hives    Codeine Rash    Demerol [Meperidine] Itching    Penicillins Hives       Review of Systems     All other symptoms negative. Physical Exam and Vital Signs   Vital Signs - Reviewed the patient's vital signs. Patient Vitals for the past 12 hrs:   Temp Pulse Resp BP SpO2   05/12/22 2145 99 °F (37.2 °C) 72 18 (!) 133/95 97 %     Physical Exam:    GENERAL: not in apparent distress  HEENT:  * EOMI  * Head atraumatic  HEENT  * Head and face atraumatic  * Normal voice, no drooling, no stridor  * No facial swelling, erythema, or cellulitis  * No sublingual fullness  * Dentition: Sockets without infection on the right side of the maxillary molar area. No evidence of infection.   * No aphthous ulcers  * Normal uvula without deviation  * No tonsillitis or evidence of abscess  * No pharyngeal erythema  CV:  * warm extremities  * no cyanosis  PULMONARY: no respiratory distress, non labored breathing, no audible wheezing or stridor, no accessory muscle use  ABDOMEN: soft, moving in bed and pulls to seated position without grimace or pain  EXTREMITIES/BACK: moving four extremities without limitation  SKIN: no rashes or signs of trauma  NEURO:  * Speech clear  * GCS 15    Medical Decision Making   - I am the first and primary provider for this patient and am the primary provider of record. - I reviewed the vital signs, available nursing notes, past medical history, past surgical history, family history and social history. - Initial assessment performed. The patients presenting problems have been discussed, and the staff are in agreement with the care plan formulated and outlined with them. I have encouraged them to ask questions as they arise throughout their visit. - Available medical records, nursing notes, old EKGs, and EMS run sheets (if patient was EMS transported) were reviewed    MDM:   Patient is a 36 y.o. female presenting for dental pain. Vitals reveal no significant abnormalities and physical exam reveals post extraction. Based on the history, physical exam, risk factors, and vitals signs, I favor the following differential diagnoses: Periapical abscess, tooth abscess, toothache, gingivitis. No concern for Ludwigs angina due to the absence of sublingual fullness. No concern for pharyngitis, or retropharyngeal, paratonsillar, or facial abscess due to non-suggestive physical exam.     Results     Labs:  No results found for this or any previous visit (from the past 12 hour(s)). Radiologic Studies:  CT Results  (Last 48 hours)    None        CXR Results  (Last 48 hours)    None        Medications ordered:  Medications   oxyCODONE IR (ROXICODONE) tablet 5 mg (has no administration in time range)   ondansetron (ZOFRAN ODT) tablet 4 mg (has no administration in time range)     ED Course and Reassessment     ED Course:          Reassessment:    Patient presents with a picture of dental pain. No concern for Ludwigs angina due to the absence of sublingual fullness. No concern for pharyngitis, or retropharyngeal, paratonsillar, or facial abscess due to non-suggestive physical exam.     Will provide patient with symptomatic treatment, antibiotics, and dentistry follow up with return precaution.  The patient was told that if they do not follow with dentistry for management the condition may get worse and progress to fulminant infections that may compromise airway and cause significant systemic infections. Patient is tolerating PO intake at time of discharge. Final Disposition     DISCHARGED FROM EMERGENCY DEPARTMENT    Patient will be discharged from the Emergency Department in stable condition. All of the diagnostic tests were reviewed and any questions were answered. Diagnosis, results, follow up if applicable, and return precautions were discussed. I have also put together printed discharge instructions for them that include: 1) educational information regarding their diagnosis, 2) how to care for their diagnosis at home, as well a 3) list of reasons why they would want to return to the ED prior to their follow-up appointment, should their condition change. Any labs or imaging done in the ED will be either printed with the discharge paperwork or available through [address] DISCHARGE PLAN:  1. Current Discharge Medication List      CONTINUE these medications which have NOT CHANGED    Details   ondansetron (Zofran ODT) 4 mg disintegrating tablet Take 1 Tablet by mouth every eight (8) hours as needed for Nausea or Vomiting. Qty: 10 Tablet, Refills: 1      polyethylene glycol (Miralax) 17 gram/dose powder Take 17 g by mouth daily. 1 capful twice daily until good bowel movement then 1 tablespoon with 8 oz of water daily  Qty: 235 g, Refills: 0      butalbital-acetaminophen-caff (FIORICET) -40 mg per capsule Take 1 Cap by mouth every four (4) hours as needed for Headache.   Qty: 10 Cap, Refills: 0      ALPRAZolam (XANAX) 1 mg tablet TK 1 T PO  TID PRN  Refills: 1            2.   Follow-up Information     Follow up With Specialties Details Why Contact Info    Your doctor  Schedule an appointment as soon as possible for a visit in 3 days      [address] [facility] EMERGENCY DEPT Emergency Medicine Go to  If symptoms worsen [address] [facility] [address]  [phone]        3. Return to ED if worse    4. Current Discharge Medication List      START taking these medications    Details   oxyCODONE IR (Roxicodone) 5 mg immediate release tablet Take 1 Tablet by mouth every six (6) hours as needed for Pain for up to 3 days. Max Daily Amount: 20 mg.  Qty: 12 Tablet, Refills: 0  Start date: 5/13/2022, End date: 5/16/2022    Associated Diagnoses: Pain, dental      !! ondansetron hcl (Zofran) 4 mg tablet Take 1 Tablet by mouth every eight (8) hours as needed for Nausea or Vomiting. Qty: 20 Tablet, Refills: 0  Start date: 5/13/2022      clindamycin (CLEOCIN) 300 mg capsule Take 1 Capsule by mouth four (4) times daily for 5 days. Qty: 20 Capsule, Refills: 0  Start date: 5/13/2022, End date: 5/18/2022      !! ondansetron hcl (Zofran) 4 mg tablet Take 1 Tablet by mouth every eight (8) hours as needed for Nausea or Vomiting. Qty: 20 Tablet, Refills: 0  Start date: 5/13/2022       !! - Potential duplicate medications found. Please discuss with provider. Diagnosis     Clinical Impression:   1. Pain, dental      Attestations:    Neida Elizabeth MD    Please note that this dictation was completed with [employer], the computer voice recognition software. Quite often unanticipated grammatical, syntax, homophones, and other interpretive errors are inadvertently transcribed by the computer software. Please disregard these errors. Please excuse any errors that have escaped final proofreading. Thank you.

## 2023-01-11 ENCOUNTER — TRANSCRIBE ORDER (OUTPATIENT)
Dept: SCHEDULING | Age: 33
End: 2023-01-11

## 2023-01-11 DIAGNOSIS — N20.0 KIDNEY STONES: Primary | ICD-10-CM

## 2023-01-16 ENCOUNTER — HOSPITAL ENCOUNTER (OUTPATIENT)
Dept: CT IMAGING | Age: 33
Discharge: HOME | End: 2023-01-16
Attending: UROLOGY
Payer: MEDICAID

## 2023-01-16 DIAGNOSIS — N20.0 KIDNEY STONES: ICD-10-CM

## 2023-01-16 PROCEDURE — 74011000636 HC RX REV CODE- 636: Performed by: UROLOGY

## 2023-01-16 PROCEDURE — 74178 CT ABD&PLV WO CNTR FLWD CNTR: CPT

## 2023-01-16 RX ADMIN — IOPAMIDOL 100 ML: 755 INJECTION, SOLUTION INTRAVENOUS at 09:41

## 2023-01-30 ENCOUNTER — APPOINTMENT (OUTPATIENT)
Dept: GENERAL RADIOLOGY | Age: 33
End: 2023-01-30
Attending: STUDENT IN AN ORGANIZED HEALTH CARE EDUCATION/TRAINING PROGRAM
Payer: MEDICAID

## 2023-01-30 ENCOUNTER — HOSPITAL ENCOUNTER (EMERGENCY)
Age: 33
Discharge: HOME | End: 2023-01-31
Attending: STUDENT IN AN ORGANIZED HEALTH CARE EDUCATION/TRAINING PROGRAM | Admitting: STUDENT IN AN ORGANIZED HEALTH CARE EDUCATION/TRAINING PROGRAM
Payer: MEDICAID

## 2023-01-30 VITALS
TEMPERATURE: 98.7 F | RESPIRATION RATE: 18 BRPM | BODY MASS INDEX: 32.58 KG/M2 | WEIGHT: 215 LBS | HEART RATE: 77 BPM | SYSTOLIC BLOOD PRESSURE: 113 MMHG | OXYGEN SATURATION: 100 % | HEIGHT: 68 IN | DIASTOLIC BLOOD PRESSURE: 77 MMHG

## 2023-01-30 DIAGNOSIS — M79.671 RIGHT FOOT PAIN: Primary | ICD-10-CM

## 2023-01-30 PROCEDURE — 73610 X-RAY EXAM OF ANKLE: CPT

## 2023-01-30 PROCEDURE — 74011250637 HC RX REV CODE- 250/637: Performed by: STUDENT IN AN ORGANIZED HEALTH CARE EDUCATION/TRAINING PROGRAM

## 2023-01-30 PROCEDURE — 99283 EMERGENCY DEPT VISIT LOW MDM: CPT | Performed by: STUDENT IN AN ORGANIZED HEALTH CARE EDUCATION/TRAINING PROGRAM

## 2023-01-30 PROCEDURE — 73630 X-RAY EXAM OF FOOT: CPT

## 2023-01-30 RX ORDER — ACETAMINOPHEN 325 MG/1
650 TABLET ORAL
Status: COMPLETED | OUTPATIENT
Start: 2023-01-30 | End: 2023-01-30

## 2023-01-30 RX ORDER — IBUPROFEN 600 MG/1
600 TABLET ORAL
Status: COMPLETED | OUTPATIENT
Start: 2023-01-30 | End: 2023-01-30

## 2023-01-30 RX ADMIN — IBUPROFEN 600 MG: 600 TABLET ORAL at 23:01

## 2023-01-30 RX ADMIN — ACETAMINOPHEN 650 MG: 325 TABLET ORAL at 23:01

## 2023-01-31 NOTE — ED PROVIDER NOTES
[facility] EMERGENCY DEPARTMENT  EMERGENCY DEPARTMENT HISTORY AND PHYSICAL EXAM      Date: 1/30/2023  Patient Name: Marlys Petit  MRN: [id number]  [place]: 1990  Date of evaluation: 1/30/2023  Provider: Beena Lujan MD   Note Started: 1:53 AM 1/31/23    HISTORY OF PRESENT ILLNESS     Chief Complaint   Patient presents with    Foot Pain       History Provided By: Patient    HPI: Marlys Petit, 29 y.o. female with past medical history as noted below presenting to the ED for right foot pain. Patient notes about 2 to 3 hours prior to arrival she dropped heavy piece of wood on the top of her foot, made primary contact with right ankle and foot. She notes pain to the foot and difficulty ambulating due to pain. She notes additional swelling. Denies any distal numbness or tingling. She denies any other symptoms    PAST MEDICAL HISTORY   Past Medical History:  Past Medical History:   Diagnosis Date    ADHD     Asthma     Bipolar disorder ([address]     Chronic obstructive pulmonary disease (HCC)     Depression     PTSD (post-traumatic stress disorder)     Sleep apnea        Past Surgical History:  Past Surgical History:   Procedure Laterality Date    COLONOSCOPY N/A 12/9/2021    COLONOSCOPY WITH POSSIBLE BIPSY AND UPPER ENDOSCOPY performed by Braulio Quinn MD at [facility] ENDOSCOPY    HX PELVIC LAPAROSCOPY      Endometriosis    HX TONSILLECTOMY         Family History:  Family History   Problem Relation Age of Onset    Cancer Mother     Heart Disease Father        Social History:  Social History     Tobacco Use    Smoking status: Every Day     Packs/day: 0.50     Types: Cigarettes    Smokeless tobacco: Never   Substance Use Topics    Alcohol use: Not Currently    Drug use: Never       Allergies:   Allergies   Allergen Reactions    Latex Hives    Codeine Rash    Demerol [Meperidine] Itching    Doxycycline Hcl Rash    Penicillins Hives       PCP: Matty Nguyễn MD    Current Meds:   Discharge Medication List as of 1/30/2023 11:58 PM        CONTINUE these medications which have NOT CHANGED    Details   !! ondansetron hcl (Zofran) 4 mg tablet Take 1 Tablet by mouth every eight (8) hours as needed for Nausea or Vomiting., Normal, Disp-20 Tablet, R-0      !! ondansetron hcl (Zofran) 4 mg tablet Take 1 Tablet by mouth every eight (8) hours as needed for Nausea or Vomiting., Normal, Disp-20 Tablet, R-0      ondansetron (Zofran ODT) 4 mg disintegrating tablet Take 1 Tablet by mouth every eight (8) hours as needed for Nausea or Vomiting., Normal, Disp-10 Tablet, R-1      polyethylene glycol (Miralax) 17 gram/dose powder Take 17 g by mouth daily. 1 capful twice daily until good bowel movement then 1 tablespoon with 8 oz of water daily, Normal, Disp-235 g, R-0      butalbital-acetaminophen-caff (FIORICET) -40 mg per capsule Take 1 Cap by mouth every four (4) hours as needed for Headache., Normal, Disp-10 Cap,R-0      ALPRAZolam (XANAX) 1 mg tablet TK 1 T PO  TID PRN, Historical Med, R-1       !! - Potential duplicate medications found. Please discuss with provider. REVIEW OF SYSTEMS   Review of Systems  Positives and Pertinent negatives as per HPI. PHYSICAL EXAM     ED Triage Vitals [01/30/23 2244]   ED Encounter Vitals Group      /77      Pulse (Heart Rate) 77      Resp Rate 18      Temp 98.7 °F (37.1 °C)      Temp src       O2 Sat (%) 100 %      Weight 215 lb      Height 5' 8\"      Physical Exam  Vitals and nursing note reviewed. Constitutional:       General: She is not in acute distress. Appearance: Normal appearance. HENT:      Head: Normocephalic. Eyes:      Extraocular Movements: Extraocular movements intact. Pupils: Pupils are equal, round, and reactive to light. Cardiovascular:      Rate and Rhythm: Normal rate. Pulmonary:      Effort: Pulmonary effort is normal.      Breath sounds: Normal breath sounds. Abdominal:      General: Abdomen is flat.    Musculoskeletal:      Cervical back: Neck supple. Comments: Right ankle: There is anterior tenderness palpation with swelling, there is no bruising, there is no plantar bruising. Patient has full range of motion although slightly limited due to pain. There is no malleoli or tenderness, there is a negative squeeze test.  There is no proximal fibular tenderness. There is full range of motion at the wrist.   Skin:     General: Skin is warm. Neurological:      Mental Status: She is alert and oriented to person, place, and time. ED COURSE and DIFFERENTIAL DIAGNOSIS/MDM   Records Reviewed (source and summary of external notes): Nursing notes    Vitals:    Vitals:    01/30/23 2244   BP: 113/77   Pulse: 77   Resp: 18   Temp: 98.7 °F (37.1 °C)   SpO2: 100%   Weight: 97.5 kg (215 lb)   Height: 5' 8\" (1.727 m)       CC/HPI Summary, DDx, ED Course, and Reassessment: 56-year-old female presenting to the ED for right foot pain. Differential diagnosis includes contusion fracture, less likely sprain strain. Will obtain x-rays and provide medications. Patient was given the following medications:  Medications   ibuprofen (MOTRIN) tablet 600 mg (600 mg Oral Given 1/30/23 2301)   acetaminophen (TYLENOL) tablet 650 mg (650 mg Oral Given 1/30/23 2301)     X-ray shows no acute fracture. Pain not improved with medications. Will provide Aircast for stability along with crutches. Patient will follow-up with orthopedist.  She is aware that repeat x-rays will likely be done with ortho follow up and may show fracture that is newly revealed.     CONSULTS: (Who and What was discussed)  None     Chronic Conditions: As above  Social Determinants affecting Dx or Tx: None  Counseling:      Disposition Considerations (Tests not done, Shared Decision Making, Pt Expectation of Test or Treatment.):      SCREENINGS               No data recorded        LAB, EKG AND DIAGNOSTIC RESULTS   Labs:  No results found for this or any previous visit (from the past 12 hour(s)). Radiologic Studies:  Non-plain film images such as CT, Ultrasound and MRI are read by the radiologist. Plain radiographic images are visualized and preliminarily interpreted by the ED Provider with the below findings:    Interpretation per the Radiologist below, if available at the time of this note:  XR ANKLE RT MIN 3 V    Result Date: 1/30/2023  EXAM: XR ANKLE RT MIN 3 V INDICATION: Impact injury to top of foot and ankle with right foot/ankle pain COMPARISON: None. FINDINGS: Three views of the right ankle demonstrate no fracture or disruption of the ankle mortise. No other osseous, articular or soft tissue abnormalities are identified. No acute abnormality. XR FOOT RT MIN 3 V    Result Date: 1/30/2023  EXAM:  XR FOOT RT MIN 3 V INDICATION:   patient dropped heavy piece of wood on top of foot, tenderness and swelling to top of right foot COMPARISON:  None. FINDINGS:  3 views of the right foot demonstrate no fracture or other osseous, articular or soft tissue abnormality. No acute abnormality. PROCEDURES   Unless otherwise noted below, none. Performed by: Neelima Arredondo MD   Procedures      CRITICAL CARE TIME       FINAL IMPRESSION     1. Right foot pain          DISPOSITION/PLAN   Discharged    Discharge Note: The patient is stable for discharge home. The signs, symptoms, diagnosis, and discharge instructions have been discussed, understanding conveyed, and agreed upon. The patient is to follow up as recommended or return to ER should their symptoms worsen.       PATIENT REFERRED TO:  Follow-up Information       Follow up With Specialties Details Why Contact Info    Kelin Berry MD Family Medicine Call in 1 day  [address] [facility] [address] [phone]                DISCHARGE MEDICATIONS:  Discharge Medication List as of 1/30/2023 11:58 PM            DISCONTINUED MEDICATIONS:  Discharge Medication List as of 1/30/2023 11:58 PM          I am the Primary Clinician of Record: Esa Bravo MD (electronically signed)    (Please note that parts of this dictation were completed with voice recognition software. Quite often unanticipated grammatical, syntax, homophones, and other interpretive errors are inadvertently transcribed by the computer software. Please disregards these errors.  Please excuse any errors that have escaped final proofreading.)

## 2023-01-31 NOTE — ED TRIAGE NOTES
3o YO F presents with right foot pain x 2 hours. State she dropped a piece of thick wood on her foot.

## 2023-01-31 NOTE — DISCHARGE INSTRUCTIONS
Thank you! Thank you for allowing me to care for you in the emergency department. I sincerely hope that you are satisfied with your visit today. It is my goal to provide you with excellent care. Below you will find a list of your labs and imaging from your visit today if applicable. Should you have any questions regarding these results please do not hesitate to call the emergency department. Please review [facility] for a more detailed result list since the below list may not be comprehensive. Instructions on how to sign up to [facility] should be provided in this packet. Labs -   No results found for this or any previous visit (from the past 12 hour(s)). Radiologic Studies -   XR ANKLE RT MIN 3 V   Final Result   No acute abnormality. XR FOOT RT MIN 3 V   Final Result   No acute abnormality. CT Results  (Last 48 hours)      None          CXR Results  (Last 48 hours)      None               If you feel that you have not received excellent quality care or timely care, please ask to speak to the nurse manager. Please choose us in the future for your continued health care needs. ------------------------------------------------------------------------------------------------------------  The exam and treatment you received in the Emergency Department were for an urgent problem and are not intended as complete care. It is very important that you follow-up with a doctor, nurse practitioner, or physician assistant in a timely manner to:  (1) confirm your diagnosis and review all imaging and lab results,  (2) re-evaluation of changes in your illness and treatment, and  (3) for ongoing care. If your symptoms become worse or you do not improve as expected and you are unable to reach your usual health care provider, you should return to the Emergency Department. We are available 24 hours a day.      Please take your discharge instructions with you when you go to your follow-up appointment. If a prescription has been provided, please have it filled as soon as possible to prevent a delay in treatment. Read the entire medication instruction sheet provided to you by the pharmacy. If you have any questions or reservations about taking the medication due to side effects or interactions with other medications, please call your primary care physician or contact the ER to speak with the charge nurse. Make an appointment with your family doctor or the physician you were referred to for follow-up of this visit as instructed on your discharge paperwork, as this is a mandatory follow-up. Return to the ER if you are unable to be seen or if you are unable to be seen in a timely manner. If you have any problem arranging the follow-up visit, contact the Emergency Department immediately.

## 2023-02-23 ENCOUNTER — APPOINTMENT (OUTPATIENT)
Dept: CT IMAGING | Age: 33
End: 2023-02-23
Attending: EMERGENCY MEDICINE
Payer: MEDICAID

## 2023-02-23 ENCOUNTER — HOSPITAL ENCOUNTER (EMERGENCY)
Age: 33
Discharge: HOME | End: 2023-02-23
Attending: EMERGENCY MEDICINE
Payer: MEDICAID

## 2023-02-23 VITALS
HEART RATE: 63 BPM | TEMPERATURE: 98.1 F | RESPIRATION RATE: 16 BRPM | HEIGHT: 68 IN | SYSTOLIC BLOOD PRESSURE: 114 MMHG | DIASTOLIC BLOOD PRESSURE: 77 MMHG | OXYGEN SATURATION: 98 % | WEIGHT: 227 LBS | BODY MASS INDEX: 34.4 KG/M2

## 2023-02-23 DIAGNOSIS — N20.0 KIDNEY STONE: Primary | ICD-10-CM

## 2023-02-23 LAB
ALBUMIN SERPL-MCNC: 3.6 G/DL (ref 3.5–5)
ALBUMIN/GLOB SERPL: 0.9 (ref 1.1–2.2)
ALP SERPL-CCNC: 99 U/L (ref 45–117)
ALT SERPL-CCNC: 17 U/L (ref 12–78)
ANION GAP SERPL CALC-SCNC: 5 MMOL/L (ref 5–15)
APPEARANCE UR: ABNORMAL
AST SERPL W P-5'-P-CCNC: 7 U/L (ref 15–37)
BACTERIA URNS QL MICRO: NEGATIVE /HPF
BACTERIA URNS QL MICRO: NEGATIVE /HPF
BASOPHILS # BLD: 0 K/UL (ref 0–0.1)
BASOPHILS NFR BLD: 1 % (ref 0–1)
BILIRUB SERPL-MCNC: 0.3 MG/DL (ref 0.2–1)
BILIRUB UR QL: NEGATIVE
BUN SERPL-MCNC: 12 MG/DL (ref 6–20)
BUN/CREAT SERPL: 12 (ref 12–20)
CA-I BLD-MCNC: 9.3 MG/DL (ref 8.5–10.1)
CHLORIDE SERPL-SCNC: 110 MMOL/L (ref 97–108)
CO2 SERPL-SCNC: 25 MMOL/L (ref 21–32)
COLOR UR: ABNORMAL
CREAT SERPL-MCNC: 1.03 MG/DL (ref 0.55–1.02)
DIFFERENTIAL METHOD BLD: NORMAL
EOSINOPHIL # BLD: 0.2 K/UL (ref 0–0.4)
EOSINOPHIL NFR BLD: 2 % (ref 0–7)
EPITH CASTS URNS QL MICRO: ABNORMAL /LPF
ERYTHROCYTE [DISTWIDTH] IN BLOOD BY AUTOMATED COUNT: 13.9 % (ref 11.5–14.5)
GLOBULIN SER CALC-MCNC: 4 G/DL (ref 2–4)
GLUCOSE SERPL-MCNC: 97 MG/DL (ref 65–100)
GLUCOSE UR STRIP.AUTO-MCNC: NEGATIVE MG/DL
HCG UR QL: NEGATIVE
HCT VFR BLD AUTO: 43 % (ref 35–47)
HGB BLD-MCNC: 13.5 G/DL (ref 11.5–16)
HGB UR QL STRIP: ABNORMAL
IMM GRANULOCYTES # BLD AUTO: 0 K/UL (ref 0–0.04)
IMM GRANULOCYTES NFR BLD AUTO: 0 % (ref 0–0.5)
KETONES UR QL STRIP.AUTO: NEGATIVE MG/DL
LEUKOCYTE ESTERASE UR QL STRIP.AUTO: NEGATIVE
LYMPHOCYTES # BLD: 1.5 K/UL (ref 0.8–3.5)
LYMPHOCYTES NFR BLD: 18 % (ref 12–49)
MCH RBC QN AUTO: 26.6 PG (ref 26–34)
MCHC RBC AUTO-ENTMCNC: 31.4 G/DL (ref 30–36.5)
MCV RBC AUTO: 84.8 FL (ref 80–99)
MONOCYTES # BLD: 0.8 K/UL (ref 0–1)
MONOCYTES NFR BLD: 9 % (ref 5–13)
MUCOUS THREADS URNS QL MICRO: ABNORMAL /LPF
MUCOUS THREADS URNS QL MICRO: ABNORMAL /LPF
NEUTS SEG # BLD: 6.2 K/UL (ref 1.8–8)
NEUTS SEG NFR BLD: 70 % (ref 32–75)
NITRITE UR QL STRIP.AUTO: NEGATIVE
NRBC # BLD: 0 K/UL (ref 0–0.01)
NRBC BLD-RTO: 0 PER 100 WBC
PH UR STRIP: 5 (ref 5–8)
PLATELET # BLD AUTO: 256 K/UL (ref 150–400)
PMV BLD AUTO: 10.3 FL (ref 8.9–12.9)
POTASSIUM SERPL-SCNC: 4.1 MMOL/L (ref 3.5–5.1)
PROT SERPL-MCNC: 7.6 G/DL (ref 6.4–8.2)
PROT UR STRIP-MCNC: 100 MG/DL
RBC # BLD AUTO: 5.07 M/UL (ref 3.8–5.2)
RBC #/AREA URNS HPF: >100 /HPF (ref 0–5)
RBC #/AREA URNS HPF: >100 /HPF (ref 0–5)
SODIUM SERPL-SCNC: 140 MMOL/L (ref 136–145)
SP GR UR REFRACTOMETRY: 1.03 (ref 1–1.03)
UROBILINOGEN UR QL STRIP.AUTO: 0.1 EU/DL (ref 0.1–1)
WBC # BLD AUTO: 8.7 K/UL (ref 3.6–11)
WBC URNS QL MICRO: ABNORMAL /HPF (ref 0–4)
WBC URNS QL MICRO: ABNORMAL /HPF (ref 0–4)

## 2023-02-23 PROCEDURE — 74176 CT ABD & PELVIS W/O CONTRAST: CPT

## 2023-02-23 PROCEDURE — 85025 COMPLETE CBC W/AUTO DIFF WBC: CPT

## 2023-02-23 PROCEDURE — 99284 EMERGENCY DEPT VISIT MOD MDM: CPT

## 2023-02-23 PROCEDURE — 81001 URINALYSIS AUTO W/SCOPE: CPT

## 2023-02-23 PROCEDURE — 80053 COMPREHEN METABOLIC PANEL: CPT

## 2023-02-23 PROCEDURE — 81025 URINE PREGNANCY TEST: CPT

## 2023-02-23 PROCEDURE — 96374 THER/PROPH/DIAG INJ IV PUSH: CPT

## 2023-02-23 PROCEDURE — 74011250636 HC RX REV CODE- 250/636: Performed by: EMERGENCY MEDICINE

## 2023-02-23 PROCEDURE — 96375 TX/PRO/DX INJ NEW DRUG ADDON: CPT

## 2023-02-23 RX ORDER — ONDANSETRON 4 MG/1
4 TABLET, ORALLY DISINTEGRATING ORAL
Qty: 12 TABLET | Refills: 0 | Status: SHIPPED | OUTPATIENT
Start: 2023-02-23

## 2023-02-23 RX ORDER — TAMSULOSIN HYDROCHLORIDE 0.4 MG/1
0.4 CAPSULE ORAL DAILY
Qty: 15 CAPSULE | Refills: 0 | Status: SHIPPED | OUTPATIENT
Start: 2023-02-23 | End: 2023-03-10

## 2023-02-23 RX ORDER — OXYCODONE AND ACETAMINOPHEN 5; 325 MG/1; MG/1
1 TABLET ORAL
Qty: 12 TABLET | Refills: 0 | Status: SHIPPED | OUTPATIENT
Start: 2023-02-23 | End: 2023-02-26

## 2023-02-23 RX ORDER — ONDANSETRON 2 MG/ML
4 INJECTION INTRAMUSCULAR; INTRAVENOUS ONCE
Status: DISCONTINUED | OUTPATIENT
Start: 2023-02-23 | End: 2023-02-23

## 2023-02-23 RX ORDER — ONDANSETRON 2 MG/ML
4 INJECTION INTRAMUSCULAR; INTRAVENOUS ONCE
Status: COMPLETED | OUTPATIENT
Start: 2023-02-23 | End: 2023-02-23

## 2023-02-23 RX ORDER — NAPROXEN 500 MG/1
500 TABLET ORAL 2 TIMES DAILY WITH MEALS
Qty: 20 TABLET | Refills: 0 | Status: SHIPPED | OUTPATIENT
Start: 2023-02-23

## 2023-02-23 RX ORDER — HYDROMORPHONE HYDROCHLORIDE 1 MG/ML
1 INJECTION, SOLUTION INTRAMUSCULAR; INTRAVENOUS; SUBCUTANEOUS ONCE
Status: COMPLETED | OUTPATIENT
Start: 2023-02-23 | End: 2023-02-23

## 2023-02-23 RX ADMIN — HYDROMORPHONE HYDROCHLORIDE 1 MG: 1 INJECTION, SOLUTION INTRAMUSCULAR; INTRAVENOUS; SUBCUTANEOUS at 13:30

## 2023-02-23 RX ADMIN — ONDANSETRON 4 MG: 2 INJECTION INTRAMUSCULAR; INTRAVENOUS at 13:29

## 2023-02-23 NOTE — ED TRIAGE NOTES
Pt arrives by EMS from home. Pt reports N/V x2 days and then approximately 1 hour ago sudden onset of R flank pain. Hx of kidney stones. Pt received 100mcg fentanyl, 4mg Zofran and 15mg Toradol from EMS.

## 2023-02-23 NOTE — Clinical Note
[facility] EMERGENCY DEPT  [address] [place] [address] [place] [id number]  [phone]    Work/School Note    Date: 2/23/2023    To Whom It May concern:    Marion was seen and treated today in the emergency room by the following provider(s):  Attending Provider: Alma Cain MD.      Marion is excused from work/school on 02/23/23 and 02/24/23. She is medically clear to return to work/school on 2/25/2023.        Sincerely,          Eloisa Rosas MD

## 2023-02-23 NOTE — DISCHARGE INSTRUCTIONS
Thank you! Thank you for allowing me to care for you in the emergency department. It is my goal to provide you with excellent care. If you have not received excellent quality care, please ask to speak to the nurse manager. Please fill out the survey that will come to you by mail or email since we listen to your feedback! Below you will find a list of your tests from today's visit. Should you have any questions, please do not hesitate to call the emergency department. Labs  Recent Results (from the past 12 hour(s))   URINALYSIS W/ RFLX MICROSCOPIC    Collection Time: 02/23/23 11:26 AM   Result Value Ref Range    Color Yellow/Straw      Appearance Turbid (A) Clear      Specific gravity 1.028 1.003 - 1.030      pH (UA) 5.0 5.0 - 8.0      Protein 100 (A) Negative mg/dL    Glucose Negative Negative mg/dL    Ketone Negative Negative mg/dL    Bilirubin Negative Negative      Blood Large (A) Negative      Urobilinogen 0.1 0.1 - 1.0 EU/dL    Nitrites Negative Negative      Leukocyte Esterase Negative Negative      WBC 0-4 0 - 4 /hpf    RBC >100 (H) 0 - 5 /hpf    Bacteria Negative Negative /hpf    Mucus 4+ /lpf   HCG URINE, QL    Collection Time: 02/23/23 11:26 AM   Result Value Ref Range    HCG urine, QL Negative Negative     CBC WITH AUTOMATED DIFF    Collection Time: 02/23/23 11:26 AM   Result Value Ref Range    WBC 8.7 3.6 - 11.0 K/uL    RBC 5.07 3.80 - 5.20 M/uL    HGB 13.5 11.5 - 16.0 g/dL    HCT 43.0 35.0 - 47.0 %    MCV 84.8 80.0 - 99.0 FL    MCH 26.6 26.0 - 34.0 PG    MCHC 31.4 30.0 - 36.5 g/dL    RDW 13.9 11.5 - 14.5 %    PLATELET 104 933 - 808 K/uL    MPV 10.3 8.9 - 12.9 FL    NRBC 0.0 0.0  WBC    ABSOLUTE NRBC 0.00 0.00 - 0.01 K/uL    NEUTROPHILS 70 32 - 75 %    LYMPHOCYTES 18 12 - 49 %    MONOCYTES 9 5 - 13 %    EOSINOPHILS 2 0 - 7 %    BASOPHILS 1 0 - 1 %    IMMATURE GRANULOCYTES 0 0 - 0.5 %    ABS. NEUTROPHILS 6.2 1.8 - 8.0 K/UL    ABS. LYMPHOCYTES 1.5 0.8 - 3.5 K/UL    ABS.  MONOCYTES 0.8 0.0 - 1.0 K/UL    ABS. EOSINOPHILS 0.2 0.0 - 0.4 K/UL    ABS. BASOPHILS 0.0 0.0 - 0.1 K/UL    ABS. IMM. GRANS. 0.0 0.00 - 0.04 K/UL    DF AUTOMATED     METABOLIC PANEL, COMPREHENSIVE    Collection Time: 02/23/23 11:26 AM   Result Value Ref Range    Sodium 140 136 - 145 mmol/L    Potassium 4.1 3.5 - 5.1 mmol/L    Chloride 110 (H) 97 - 108 mmol/L    CO2 25 21 - 32 mmol/L    Anion gap 5 5 - 15 mmol/L    Glucose 97 65 - 100 mg/dL    BUN 12 6 - 20 mg/dL    Creatinine 1.03 (H) 0.55 - 1.02 mg/dL    BUN/Creatinine ratio 12 12 - 20      eGFR >60 >60 ml/min/1.73m2    Calcium 9.3 8.5 - 10.1 mg/dL    Bilirubin, total 0.3 0.2 - 1.0 mg/dL    AST (SGOT) 7 (L) 15 - 37 U/L    ALT (SGPT) 17 12 - 78 U/L    Alk. phosphatase 99 45 - 117 U/L    Protein, total 7.6 6.4 - 8.2 g/dL    Albumin 3.6 3.5 - 5.0 g/dL    Globulin 4.0 2.0 - 4.0 g/dL    A-G Ratio 0.9 (L) 1.1 - 2.2     URINE MICROSCOPIC    Collection Time: 02/23/23 11:26 AM   Result Value Ref Range    WBC 0-4 0 - 4 /hpf    RBC >100 (H) 0 - 5 /hpf    Epithelial cells Moderate (A) Few /lpf    Bacteria Negative Negative /hpf    Mucus 4+ (A) Negative /lpf       Radiologic Studies  CT ABD PELV WO CONT   Final Result   No acute process identified in the abdomen or pelvis        CT Results  (Last 48 hours)                 02/23/23 1232  CT ABD PELV WO CONT Final result    Impression:  No acute process identified in the abdomen or pelvis       Narrative:  EXAM: CT ABD PELV WO CONT       INDICATION: R flank pain       COMPARISON: 1/16/2023       IV CONTRAST: None. ORAL CONTRAST: 0       TECHNIQUE:    Thin axial images were obtained through the abdomen and pelvis. Coronal and   sagittal reformats were generated. CT dose reduction was achieved through use of   a standardized protocol tailored for this examination and automatic exposure   control for dose modulation. The absence of intravenous contrast material reduces the sensitivity for   evaluation of the vasculature and solid organs. FINDINGS:    LOWER THORAX: No significant abnormality in the incidentally imaged lower chest.   LIVER: No mass. BILIARY TREE: Gallbladder is within normal limits. CBD is not dilated. SPLEEN: within normal limits. PANCREAS: No focal abnormality. ADRENALS: Unremarkable. KIDNEYS/URETERS: No calculus or hydronephrosis. STOMACH: Unremarkable. SMALL BOWEL: No dilatation or wall thickening. COLON: No dilatation or wall thickening. APPENDIX: Normal axial image 63   PERITONEUM: No ascites or pneumoperitoneum. RETROPERITONEUM: No lymphadenopathy or aortic aneurysm. REPRODUCTIVE ORGANS: Anteverted uterus   URINARY BLADDER: No mass or calculus. BONES: No destructive bone lesion. ABDOMINAL WALL: No mass or hernia. ADDITIONAL COMMENTS: N/A                 CXR Results  (Last 48 hours)      None          ------------------------------------------------------------------------------------------------------------  The exam and treatment you received in the Emergency Department were for an urgent problem and are not intended as complete care. It is important that you follow-up with a doctor, nurse practitioner, or physician assistant to:  (1) confirm your diagnosis,  (2) re-evaluation of changes in your illness and treatment, and  (3) for ongoing care. Please take your discharge instructions with you when you go to your follow-up appointment. If you have any problem arranging a follow-up appointment, contact the Emergency Department. If your symptoms become worse or you do not improve as expected and you are unable to reach your health care provider, please return to the Emergency Department. We are available 24 hours a day. If a prescription has been provided, please have it filled as soon as possible to prevent a delay in treatment.  If you have any questions or reservations about taking the medication due to side effects or interactions with other medications, please call your primary care provider or contact the ER.

## 2023-02-23 NOTE — Clinical Note
[facility] EMERGENCY DEPT  [address] [place] [id number]  [phone]    Work/School Note    Date: 2/23/2023    To Whom It May concern:      Marion was seen and treated today in the emergency room by the following provider(s):  Attending Provider: Dai Bridges MD.      Marion is excused from work/school on 02/23/23. She is clear to return to work/school on 02/24/23.         Sincerely,          El Monson MD

## 2023-02-23 NOTE — ED NOTES
Pt understanding of dc instructions medications and followup care, alert oriented and ambulatory at discharge

## 2023-02-23 NOTE — ED PROVIDER NOTES
EMERGENCY DEPARTMENT HISTORY AND PHYSICAL EXAM      Date: 2/23/2023  Patient Name: Melanie Urbano    History of Presenting Illness     Chief Complaint   Patient presents with    Flank Pain    Vomiting       History Provided By: Patient    HPI: Melanie Urbano, 29 y.o. female with a past medical history significant No significant past medical history presents to the ED with chief complaint of Flank Pain and Vomiting  . 28-year-old female with a few hours of severe right flank pain. 10 out of 10 pain. No meds prior to arrival has subsided down to a 7 now. Vomiting nonbloody. Pain is isolated only to the right flank. There are no other complaints, changes, or physical findings at this time. PCP: Cathi Osorio MD    Current Outpatient Medications   Medication Sig Dispense Refill    oxyCODONE-acetaminophen (Percocet) 5-325 mg per tablet Take 1 Tablet by mouth every six (6) hours as needed for Pain for up to 3 days. Max Daily Amount: 4 Tablets. 12 Tablet 0    tamsulosin (Flomax) 0.4 mg capsule Take 1 Capsule by mouth daily for 15 days. 15 Capsule 0    ondansetron (Zofran ODT) 4 mg disintegrating tablet Take 1 Tablet by mouth every eight (8) hours as needed for Nausea or Vomiting. 12 Tablet 0    naproxen (NAPROSYN) 500 mg tablet Take 1 Tablet by mouth two (2) times daily (with meals). 20 Tablet 0    ondansetron hcl (Zofran) 4 mg tablet Take 1 Tablet by mouth every eight (8) hours as needed for Nausea or Vomiting. 20 Tablet 0    ondansetron hcl (Zofran) 4 mg tablet Take 1 Tablet by mouth every eight (8) hours as needed for Nausea or Vomiting. 20 Tablet 0    ondansetron (Zofran ODT) 4 mg disintegrating tablet Take 1 Tablet by mouth every eight (8) hours as needed for Nausea or Vomiting. 10 Tablet 1    polyethylene glycol (Miralax) 17 gram/dose powder Take 17 g by mouth daily.  1 capful twice daily until good bowel movement then 1 tablespoon with 8 oz of water daily (Patient not taking: Reported on 12/9/2021) 235 g 0    butalbital-acetaminophen-caff (FIORICET) -40 mg per capsule Take 1 Cap by mouth every four (4) hours as needed for Headache. 10 Cap 0    ALPRAZolam (XANAX) 1 mg tablet TK 1 T PO  TID PRN  1       Past History     Past Medical History:  Past Medical History:   Diagnosis Date    ADHD     Asthma     Bipolar disorder ([facility] [address]     Chronic obstructive pulmonary disease (HCC)     Depression     PTSD (post-traumatic stress disorder)     Sleep apnea        Past Surgical History:  Past Surgical History:   Procedure Laterality Date    COLONOSCOPY N/A 12/9/2021    COLONOSCOPY WITH POSSIBLE BIPSY AND UPPER ENDOSCOPY performed by Radha Cardenas MD at [address] [place] [address] ENDOSCOPY    HX PELVIC LAPAROSCOPY      Endometriosis    HX TONSILLECTOMY         Family History:  Family History   Problem Relation Age of Onset    Cancer Mother     Heart Disease Father        Social History:  Social History     Tobacco Use    Smoking status: Every Day     Packs/day: 0.50     Types: Cigarettes    Smokeless tobacco: Never   Substance Use Topics    Alcohol use: Not Currently    Drug use: Never       Allergies: Allergies   Allergen Reactions    Latex Hives    Codeine Rash    Demerol [Meperidine] Itching    Doxycycline Hcl Rash    Penicillins Hives         Review of Systems   Review of Systems   Constitutional: Negative. Negative for chills, fatigue and fever. HENT: Negative. Negative for congestion, nosebleeds and sore throat. Eyes: Negative. Negative for pain, discharge and visual disturbance. Respiratory: Negative. Negative for cough, chest tightness and shortness of breath. Cardiovascular:  Negative for chest pain, palpitations and leg swelling. Gastrointestinal:  Negative for abdominal pain, blood in stool, constipation, diarrhea, nausea and vomiting. Endocrine: Negative. Genitourinary:  Positive for flank pain. Negative for difficulty urinating, dysuria, pelvic pain and vaginal bleeding. Musculoskeletal:  Positive for back pain. Negative for arthralgias and myalgias. Skin: Negative. Negative for rash and wound. Allergic/Immunologic: Negative. Neurological: Negative. Negative for dizziness, syncope, weakness, numbness and headaches. Hematological: Negative. Psychiatric/Behavioral: Negative. Negative for agitation, confusion and suicidal ideas. All other systems reviewed and are negative. Positives and Pertinent negatives as per HPI. Physical Exam   Patient Vitals for the past 24 hrs:   Temp Pulse Resp BP SpO2   02/23/23 1113 98.1 °F (36.7 °C) 63 16 114/77 98 %         Physical Exam  Vitals and nursing note reviewed. Exam conducted with a chaperone present. Constitutional:       Appearance: Normal appearance. She is normal weight. HENT:      Head: Normocephalic and atraumatic. Nose: Nose normal.      Mouth/Throat:      Mouth: Mucous membranes are moist.      Pharynx: Oropharynx is clear. Eyes:      Extraocular Movements: Extraocular movements intact. Conjunctiva/sclera: Conjunctivae normal.      Pupils: Pupils are equal, round, and reactive to light. Cardiovascular:      Rate and Rhythm: Normal rate and regular rhythm. Pulses: Normal pulses. Heart sounds: Normal heart sounds. Pulmonary:      Effort: Pulmonary effort is normal. No respiratory distress. Breath sounds: Normal breath sounds. Abdominal:      General: Abdomen is flat. Bowel sounds are normal. There is no distension. Palpations: Abdomen is soft. Tenderness: There is no abdominal tenderness. There is right CVA tenderness. There is no guarding. Musculoskeletal:         General: No swelling, tenderness, deformity or signs of injury. Normal range of motion. Cervical back: Normal range of motion and neck supple. Right lower leg: No edema. Left lower leg: No edema. Skin:     General: Skin is warm and dry.       Capillary Refill: Capillary refill takes less than 2 seconds. Findings: No lesion or rash. Neurological:      General: No focal deficit present. Mental Status: She is alert and oriented to person, place, and time. Mental status is at baseline. Cranial Nerves: No cranial nerve deficit. Psychiatric:         Mood and Affect: Mood normal.         Behavior: Behavior normal.         Thought Content: Thought content normal.         Judgment: Judgment normal.       Diagnostic Study Results     LABS:   Recent Results (from the past 24 hour(s))   URINALYSIS W/ RFLX MICROSCOPIC    Collection Time: 02/23/23 11:26 AM   Result Value Ref Range    Color Yellow/Straw      Appearance Turbid (A) Clear      Specific gravity 1.028 1.003 - 1.030      pH (UA) 5.0 5.0 - 8.0      Protein 100 (A) Negative mg/dL    Glucose Negative Negative mg/dL    Ketone Negative Negative mg/dL    Bilirubin Negative Negative      Blood Large (A) Negative      Urobilinogen 0.1 0.1 - 1.0 EU/dL    Nitrites Negative Negative      Leukocyte Esterase Negative Negative      WBC 0-4 0 - 4 /hpf    RBC >100 (H) 0 - 5 /hpf    Bacteria Negative Negative /hpf    Mucus 4+ /lpf   HCG URINE, QL    Collection Time: 02/23/23 11:26 AM   Result Value Ref Range    HCG urine, QL Negative Negative     CBC WITH AUTOMATED DIFF    Collection Time: 02/23/23 11:26 AM   Result Value Ref Range    WBC 8.7 3.6 - 11.0 K/uL    RBC 5.07 3.80 - 5.20 M/uL    HGB 13.5 11.5 - 16.0 g/dL    HCT 43.0 35.0 - 47.0 %    MCV 84.8 80.0 - 99.0 FL    MCH 26.6 26.0 - 34.0 PG    MCHC 31.4 30.0 - 36.5 g/dL    RDW 13.9 11.5 - 14.5 %    PLATELET 281 773 - 694 K/uL    MPV 10.3 8.9 - 12.9 FL    NRBC 0.0 0.0  WBC    ABSOLUTE NRBC 0.00 0.00 - 0.01 K/uL    NEUTROPHILS 70 32 - 75 %    LYMPHOCYTES 18 12 - 49 %    MONOCYTES 9 5 - 13 %    EOSINOPHILS 2 0 - 7 %    BASOPHILS 1 0 - 1 %    IMMATURE GRANULOCYTES 0 0 - 0.5 %    ABS. NEUTROPHILS 6.2 1.8 - 8.0 K/UL    ABS. LYMPHOCYTES 1.5 0.8 - 3.5 K/UL    ABS.  MONOCYTES 0.8 0.0 - 1.0 K/UL ABS. EOSINOPHILS 0.2 0.0 - 0.4 K/UL    ABS. BASOPHILS 0.0 0.0 - 0.1 K/UL    ABS. IMM. GRANS. 0.0 0.00 - 0.04 K/UL    DF AUTOMATED     METABOLIC PANEL, COMPREHENSIVE    Collection Time: 02/23/23 11:26 AM   Result Value Ref Range    Sodium 140 136 - 145 mmol/L    Potassium 4.1 3.5 - 5.1 mmol/L    Chloride 110 (H) 97 - 108 mmol/L    CO2 25 21 - 32 mmol/L    Anion gap 5 5 - 15 mmol/L    Glucose 97 65 - 100 mg/dL    BUN 12 6 - 20 mg/dL    Creatinine 1.03 (H) 0.55 - 1.02 mg/dL    BUN/Creatinine ratio 12 12 - 20      eGFR >60 >60 ml/min/1.73m2    Calcium 9.3 8.5 - 10.1 mg/dL    Bilirubin, total 0.3 0.2 - 1.0 mg/dL    AST (SGOT) 7 (L) 15 - 37 U/L    ALT (SGPT) 17 12 - 78 U/L    Alk. phosphatase 99 45 - 117 U/L    Protein, total 7.6 6.4 - 8.2 g/dL    Albumin 3.6 3.5 - 5.0 g/dL    Globulin 4.0 2.0 - 4.0 g/dL    A-G Ratio 0.9 (L) 1.1 - 2.2     URINE MICROSCOPIC    Collection Time: 02/23/23 11:26 AM   Result Value Ref Range    WBC 0-4 0 - 4 /hpf    RBC >100 (H) 0 - 5 /hpf    Epithelial cells Moderate (A) Few /lpf    Bacteria Negative Negative /hpf    Mucus 4+ (A) Negative /lpf       No results found for this or any previous visit (from the past 12 hour(s)). EKG: EKG interpreted independently by ER physician. RADIOLOGY:  Non-plain film images such as CT, Ultrasound and MRI are read by the radiologist. Plain radiographic images are visualized and preliminarily interpreted by the ED Provider with the below findings:          Interpretation per the Radiologist below, if available at the time of this note:     CT ABD PELV WO CONT    Result Date: 2/23/2023  EXAM: CT ABD PELV WO CONT INDICATION: R flank pain COMPARISON: 1/16/2023 IV CONTRAST: None. ORAL CONTRAST: 0 TECHNIQUE: Thin axial images were obtained through the abdomen and pelvis. Coronal and sagittal reformats were generated.  CT dose reduction was achieved through use of a standardized protocol tailored for this examination and automatic exposure control for dose modulation. The absence of intravenous contrast material reduces the sensitivity for evaluation of the vasculature and solid organs. FINDINGS: LOWER THORAX: No significant abnormality in the incidentally imaged lower chest. LIVER: No mass. BILIARY TREE: Gallbladder is within normal limits. CBD is not dilated. SPLEEN: within normal limits. PANCREAS: No focal abnormality. ADRENALS: Unremarkable. KIDNEYS/URETERS: No calculus or hydronephrosis. STOMACH: Unremarkable. SMALL BOWEL: No dilatation or wall thickening. COLON: No dilatation or wall thickening. APPENDIX: Normal axial image 63 PERITONEUM: No ascites or pneumoperitoneum. RETROPERITONEUM: No lymphadenopathy or aortic aneurysm. REPRODUCTIVE ORGANS: Anteverted uterus URINARY BLADDER: No mass or calculus. BONES: No destructive bone lesion. ABDOMINAL WALL: No mass or hernia. ADDITIONAL COMMENTS: N/A     No acute process identified in the abdomen or pelvis    CT Results  (Last 48 hours)                 02/23/23 1232  CT ABD PELV WO CONT Final result    Impression:  No acute process identified in the abdomen or pelvis       Narrative:  EXAM: CT ABD PELV WO CONT       INDICATION: R flank pain       COMPARISON: 1/16/2023       IV CONTRAST: None. ORAL CONTRAST: 0       TECHNIQUE:    Thin axial images were obtained through the abdomen and pelvis. Coronal and   sagittal reformats were generated. CT dose reduction was achieved through use of   a standardized protocol tailored for this examination and automatic exposure   control for dose modulation. The absence of intravenous contrast material reduces the sensitivity for   evaluation of the vasculature and solid organs. FINDINGS:    LOWER THORAX: No significant abnormality in the incidentally imaged lower chest.   LIVER: No mass. BILIARY TREE: Gallbladder is within normal limits. CBD is not dilated. SPLEEN: within normal limits. PANCREAS: No focal abnormality. ADRENALS: Unremarkable. KIDNEYS/URETERS: No calculus or hydronephrosis. STOMACH: Unremarkable. SMALL BOWEL: No dilatation or wall thickening. COLON: No dilatation or wall thickening. APPENDIX: Normal axial image 63   PERITONEUM: No ascites or pneumoperitoneum. RETROPERITONEUM: No lymphadenopathy or aortic aneurysm. REPRODUCTIVE ORGANS: Anteverted uterus   URINARY BLADDER: No mass or calculus. BONES: No destructive bone lesion. ABDOMINAL WALL: No mass or hernia. ADDITIONAL COMMENTS: N/A                 CXR Results  (Last 48 hours)      None            Medical Decision Making and ED Course       CC/HPI Summary, DDx, ED Course, and Reassessment: 29-year-old female with severe right flank pain. Differential includes pyelonephritis versus kidney stone versus gallstones versus musculoskeletal.  Plan for lab work including CBC CMP lipase urine and a CT scan Noncon pain control with Dilaudid IV fluids and Zofran. These orders were placed during the ER evaluation:  Orders Placed This Encounter    CT ABD PELV WO CONT     Standing Status:   Standing     Number of Occurrences:   1     Order Specific Question:   Type of contrast.  PLEASE NOTE: IV contrast is NOT utilized with this order. Answer:   None     Order Specific Question:   Transport     Answer:   Stretcher [5]     Order Specific Question:   Is Patient Pregnant?      Answer:   No     Order Specific Question:   Reason for Exam     Answer:   R flank pain     Order Specific Question:   Decision Support Exception     Answer:   Emergency Medical Condition (MA) [1]    URINALYSIS W/ RFLX MICROSCOPIC     Standing Status:   Standing     Number of Occurrences:   1    HCG URINE, QL     Standing Status:   Standing     Number of Occurrences:   1    CBC WITH AUTOMATED DIFF     Standing Status:   Standing     Number of Occurrences:   1    COMPREHENSIVE METABOLIC PANEL     Standing Status:   Standing     Number of Occurrences:   1    HCG QL SERUM     Standing Status:   Standing Number of Occurrences:   1    URINE MICROSCOPIC     Standing Status:   Standing     Number of Occurrences:   1    DISCONTD: ondansetron (ZOFRAN) injection 4 mg    DISCONTD: sodium chloride 0.9 % bolus infusion 1,000 mL    HYDROmorphone (DILAUDID) injection 1 mg    ondansetron (ZOFRAN) injection 4 mg    DISCONTD: sodium chloride 0.9 % bolus infusion 1,000 mL    oxyCODONE-acetaminophen (Percocet) 5-325 mg per tablet     Sig: Take 1 Tablet by mouth every six (6) hours as needed for Pain for up to 3 days. Max Daily Amount: 4 Tablets. Dispense:  12 Tablet     Refill:  0    tamsulosin (Flomax) 0.4 mg capsule     Sig: Take 1 Capsule by mouth daily for 15 days. Dispense:  15 Capsule     Refill:  0    ondansetron (Zofran ODT) 4 mg disintegrating tablet     Sig: Take 1 Tablet by mouth every eight (8) hours as needed for Nausea or Vomiting. Dispense:  12 Tablet     Refill:  0    naproxen (NAPROSYN) 500 mg tablet     Sig: Take 1 Tablet by mouth two (2) times daily (with meals). Dispense:  20 Tablet     Refill:  0        Patient was given the following medications:  Medications   HYDROmorphone (DILAUDID) injection 1 mg (1 mg IntraVENous Given 2/23/23 1330)   ondansetron (ZOFRAN) injection 4 mg (4 mg IntraVENous Given 2/23/23 1329)           ED Course:       ED Course as of 02/24/23 0721   Thu Feb 23, 2023   1341 CT: FINDINGS:   LOWER THORAX: No significant abnormality in the incidentally imaged lower chest.  LIVER: No mass. BILIARY TREE: Gallbladder is within normal limits. CBD is not dilated. SPLEEN: within normal limits. PANCREAS: No focal abnormality. ADRENALS: Unremarkable. KIDNEYS/URETERS: No calculus or hydronephrosis. STOMACH: Unremarkable. SMALL BOWEL: No dilatation or wall thickening. COLON: No dilatation or wall thickening. APPENDIX: Normal axial image 63  PERITONEUM: No ascites or pneumoperitoneum. RETROPERITONEUM: No lymphadenopathy or aortic aneurysm.   REPRODUCTIVE ORGANS: Anteverted uterus  URINARY BLADDER: No mass or calculus. BONES: No destructive bone lesion. ABDOMINAL WALL: No mass or hernia. ADDITIONAL COMMENTS: N/A     IMPRESSION  No acute process identified in the abdomen or pelvis [HP]      ED Course User Index  [HP] Jonn Portillo MD         Vital Signs-Reviewed the patient's vital signs. Patient Vitals for the past 24 hrs:   Temp Pulse Resp BP SpO2   02/23/23 1113 98.1 °F (36.7 °C) 63 16 114/77 98 %     Vitals interpreted independently by ER physician. stable    Medical decision making tools:                No data recorded          Disposition Considerations (Tests not done, Shared Decision Making, Pt Expectation of Test or Tx.): Patient's exam urine consistent with a kidney stone and she has a history of however no evidence of a stone on CT. This may be a very small stone versus a recently passed stone. Patient advised of the 2 possibilities but patient is stable for discharge home. No concern for stone infection or pyelonephrosis. We will send patient home with [facility] pain control urology for follow-up. Patient is comfortable with the plan feeling much better at discharge after Dilaudid 2 mg. CONSULTS: (Who and What was discussed)  None    Chronic Conditions: no    Social Determinants affecting Dx or Tx: None    Records Reviewed (source and summary of external notes): Prior medical records, Previous Radiology studies, and Previous Laboratory studies  Records Reviewed: Previous Hospital chart. EMS run report. I reviewed the vital signs, available nursing notes, past medical history, past surgical history, family history and social history. Initial assessment performed. The patients presenting problems have been discussed, and they are in agreement with the care plan formulated and outlined with them. I have encouraged them to ask questions as they arise throughout their visit.         Discharged      Procedures               Disposition       Emergency Department Disposition:  Discharged      Diagnosis     Clinical Impression:   1. Kidney stone        Attestations:    Viry Burks MD    Please note that this dictation was completed with [employer], the computer voice recognition software. Quite often unanticipated grammatical, syntax, homophones, and other interpretive errors are inadvertently transcribed by the computer software. Please disregard these errors. Please excuse any errors that have escaped final proofreading. Thank you.

## 2023-03-07 ENCOUNTER — TRANSCRIBE ORDER (OUTPATIENT)
Dept: REGISTRATION | Age: 33
End: 2023-03-07

## 2023-03-07 ENCOUNTER — HOSPITAL ENCOUNTER (OUTPATIENT)
Dept: GENERAL RADIOLOGY | Age: 33
Discharge: HOME | End: 2023-03-07
Payer: MEDICAID

## 2023-03-07 DIAGNOSIS — R05.3 CHRONIC COUGH: ICD-10-CM

## 2023-03-07 DIAGNOSIS — R06.02 SHORTNESS OF BREATH: ICD-10-CM

## 2023-03-07 DIAGNOSIS — R06.02 SHORTNESS OF BREATH: Primary | ICD-10-CM

## 2023-03-07 PROCEDURE — 71046 X-RAY EXAM CHEST 2 VIEWS: CPT

## 2023-05-24 RX ORDER — ALPRAZOLAM 1 MG/1
TABLET ORAL
COMMUNITY
Start: 2019-05-02

## 2023-05-24 RX ORDER — ONDANSETRON 4 MG/1
4 TABLET, ORALLY DISINTEGRATING ORAL EVERY 8 HOURS PRN
COMMUNITY
Start: 2021-10-20

## 2023-05-24 RX ORDER — POLYETHYLENE GLYCOL 3350 17 G/17G
17 POWDER, FOR SOLUTION ORAL DAILY
COMMUNITY
Start: 2021-10-20

## 2023-05-24 RX ORDER — NAPROXEN 500 MG/1
500 TABLET ORAL 2 TIMES DAILY WITH MEALS
COMMUNITY
Start: 2023-02-23

## 2023-05-24 RX ORDER — ONDANSETRON 4 MG/1
4 TABLET, FILM COATED ORAL EVERY 8 HOURS PRN
COMMUNITY
Start: 2022-05-13

## 2023-05-24 RX ORDER — BUTALBITAL, ACETAMINOPHEN AND CAFFEINE 300; 40; 50 MG/1; MG/1; MG/1
1 CAPSULE ORAL EVERY 4 HOURS PRN
COMMUNITY
Start: 2020-11-05

## 2023-07-10 ENCOUNTER — HOSPITAL ENCOUNTER (OUTPATIENT)
Facility: HOSPITAL | Age: 33
Discharge: HOME | End: 2023-07-13
Payer: MEDICAID

## 2023-07-10 ENCOUNTER — TRANSCRIBE ORDERS (OUTPATIENT)
Facility: HOSPITAL | Age: 33
End: 2023-07-10

## 2023-07-10 DIAGNOSIS — R52 PAIN: ICD-10-CM

## 2023-07-10 PROCEDURE — 73562 X-RAY EXAM OF KNEE 3: CPT

## 2023-07-18 RX ORDER — FAMOTIDINE 40 MG/1
40 TABLET, FILM COATED ORAL DAILY
COMMUNITY

## 2023-07-18 RX ORDER — DEXTROAMPHETAMINE SACCHARATE, AMPHETAMINE ASPARTATE MONOHYDRATE, DEXTROAMPHETAMINE SULFATE AND AMPHETAMINE SULFATE 2.5; 2.5; 2.5; 2.5 MG/1; MG/1; MG/1; MG/1
10 CAPSULE, EXTENDED RELEASE ORAL EVERY MORNING
COMMUNITY

## 2023-07-18 RX ORDER — PROPRANOLOL HYDROCHLORIDE 10 MG/1
10 TABLET ORAL 3 TIMES DAILY
COMMUNITY

## 2023-07-18 RX ORDER — CETIRIZINE HYDROCHLORIDE 10 MG/1
10 TABLET ORAL DAILY
COMMUNITY

## 2023-07-24 ENCOUNTER — HOSPITAL ENCOUNTER (OUTPATIENT)
Facility: HOSPITAL | Age: 33
Discharge: HOME | End: 2023-07-24
Attending: INTERNAL MEDICINE | Admitting: INTERNAL MEDICINE
Payer: MEDICAID

## 2023-07-24 VITALS
TEMPERATURE: 97.7 F | RESPIRATION RATE: 18 BRPM | HEIGHT: 68 IN | OXYGEN SATURATION: 99 % | HEART RATE: 84 BPM | WEIGHT: 202 LBS | BODY MASS INDEX: 30.62 KG/M2 | SYSTOLIC BLOOD PRESSURE: 118 MMHG | DIASTOLIC BLOOD PRESSURE: 72 MMHG

## 2023-07-24 DIAGNOSIS — R00.2 PALPITATIONS: ICD-10-CM

## 2023-07-24 PROBLEM — R55 SYNCOPE, NEAR: Status: ACTIVE | Noted: 2023-07-24

## 2023-07-24 LAB — ECHO BSA: 2.1 M2

## 2023-07-24 PROCEDURE — 6360000002 HC RX W HCPCS: Performed by: INTERNAL MEDICINE

## 2023-07-24 PROCEDURE — 7100000001 HC PACU RECOVERY - ADDTL 15 MIN: Performed by: INTERNAL MEDICINE

## 2023-07-24 PROCEDURE — 2580000003 HC RX 258: Performed by: INTERNAL MEDICINE

## 2023-07-24 PROCEDURE — 33285 INSJ SUBQ CAR RHYTHM MNTR: CPT | Performed by: INTERNAL MEDICINE

## 2023-07-24 PROCEDURE — 7100000010 HC PHASE II RECOVERY - FIRST 15 MIN: Performed by: INTERNAL MEDICINE

## 2023-07-24 PROCEDURE — 99152 MOD SED SAME PHYS/QHP 5/>YRS: CPT | Performed by: INTERNAL MEDICINE

## 2023-07-24 PROCEDURE — 7100000011 HC PHASE II RECOVERY - ADDTL 15 MIN: Performed by: INTERNAL MEDICINE

## 2023-07-24 PROCEDURE — 7100000000 HC PACU RECOVERY - FIRST 15 MIN: Performed by: INTERNAL MEDICINE

## 2023-07-24 PROCEDURE — 2709999900 HC NON-CHARGEABLE SUPPLY: Performed by: INTERNAL MEDICINE

## 2023-07-24 PROCEDURE — C1764 EVENT RECORDER, CARDIAC: HCPCS | Performed by: INTERNAL MEDICINE

## 2023-07-24 DEVICE — ICM LNQ22 LINQ II USA
Type: IMPLANTABLE DEVICE | Status: FUNCTIONAL
Brand: LINQ II™

## 2023-07-24 RX ORDER — SODIUM CHLORIDE 9 MG/ML
INJECTION, SOLUTION INTRAVENOUS CONTINUOUS
Status: DISCONTINUED | OUTPATIENT
Start: 2023-07-24 | End: 2023-07-24 | Stop reason: HOSPADM

## 2023-07-24 RX ORDER — FENTANYL CITRATE 50 UG/ML
25 INJECTION, SOLUTION INTRAMUSCULAR; INTRAVENOUS ONCE
Status: COMPLETED | OUTPATIENT
Start: 2023-07-24 | End: 2023-07-24

## 2023-07-24 RX ORDER — BUPIVACAINE HYDROCHLORIDE 2.5 MG/ML
INJECTION, SOLUTION EPIDURAL; INFILTRATION; INTRACAUDAL PRN
Status: DISCONTINUED | OUTPATIENT
Start: 2023-07-24 | End: 2023-07-24 | Stop reason: HOSPADM

## 2023-07-24 RX ORDER — FENTANYL CITRATE 50 UG/ML
INJECTION, SOLUTION INTRAMUSCULAR; INTRAVENOUS PRN
Status: DISCONTINUED | OUTPATIENT
Start: 2023-07-24 | End: 2023-07-24 | Stop reason: HOSPADM

## 2023-07-24 RX ORDER — MIDAZOLAM HYDROCHLORIDE 1 MG/ML
INJECTION INTRAMUSCULAR; INTRAVENOUS PRN
Status: DISCONTINUED | OUTPATIENT
Start: 2023-07-24 | End: 2023-07-24 | Stop reason: HOSPADM

## 2023-07-24 RX ADMIN — SODIUM CHLORIDE: 9 INJECTION, SOLUTION INTRAVENOUS at 11:45

## 2023-07-24 RX ADMIN — FENTANYL CITRATE 25 MCG: 50 INJECTION INTRAMUSCULAR; INTRAVENOUS at 15:34

## 2023-07-24 ASSESSMENT — PAIN DESCRIPTION - DESCRIPTORS: DESCRIPTORS: BURNING

## 2023-07-24 ASSESSMENT — PAIN DESCRIPTION - LOCATION
LOCATION: CHEST
LOCATION: CHEST

## 2023-07-24 ASSESSMENT — PAIN DESCRIPTION - PAIN TYPE: TYPE: SURGICAL PAIN

## 2023-07-24 ASSESSMENT — PAIN SCALES - GENERAL
PAINLEVEL_OUTOF10: 9
PAINLEVEL_OUTOF10: 4
PAINLEVEL_OUTOF10: 8

## 2023-07-24 ASSESSMENT — PAIN - FUNCTIONAL ASSESSMENT: PAIN_FUNCTIONAL_ASSESSMENT: NONE - DENIES PAIN

## 2023-07-24 NOTE — PERIOP NOTE
Patient alert and oriented x4, VS stable, no complaints of pain at this time. No one at bedside. Bed in low position, call bell within reach. Patient states okay to review and give discharge instructions to Herb العلي, friend.

## 2023-07-24 NOTE — PROGRESS NOTES
Pt. Transferred to [facility] via stretcher in stable condition. Bedside report given, SBAR reviewed, sites viewed. Pt.'s family/friends at bedside and her belongings.

## 2023-07-24 NOTE — PROGRESS NOTES
Iv cannula discontinued,VSS, pt and family  given discharge instructions, cath lab educated pt on device use and set up.  Discharged home on a wheelchair with all personal belongings

## 2023-09-28 ENCOUNTER — HOSPITAL ENCOUNTER (EMERGENCY)
Facility: HOSPITAL | Age: 33
Discharge: HOME | End: 2023-09-28
Attending: STUDENT IN AN ORGANIZED HEALTH CARE EDUCATION/TRAINING PROGRAM
Payer: MEDICAID

## 2023-09-28 ENCOUNTER — APPOINTMENT (OUTPATIENT)
Facility: HOSPITAL | Age: 33
End: 2023-09-28
Payer: MEDICAID

## 2023-09-28 VITALS
OXYGEN SATURATION: 98 % | BODY MASS INDEX: 31.37 KG/M2 | WEIGHT: 207 LBS | SYSTOLIC BLOOD PRESSURE: 135 MMHG | DIASTOLIC BLOOD PRESSURE: 95 MMHG | HEIGHT: 68 IN | RESPIRATION RATE: 17 BRPM | TEMPERATURE: 98.6 F | HEART RATE: 70 BPM

## 2023-09-28 DIAGNOSIS — S29.019A THORACIC MYOFASCIAL STRAIN, INITIAL ENCOUNTER: ICD-10-CM
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
## 2023-09-28 DIAGNOSIS — M79.602 LEFT ARM PAIN: ICD-10-CM

## 2023-09-28 DIAGNOSIS — R20.2 PARESTHESIA: ICD-10-CM

## 2023-09-28 DIAGNOSIS — S20.212A RIB CONTUSION, LEFT, INITIAL ENCOUNTER: ICD-10-CM

## 2023-09-28 DIAGNOSIS — F41.1 ANXIETY STATE: ICD-10-CM

## 2023-09-28 PROCEDURE — 99284 EMERGENCY DEPT VISIT MOD MDM: CPT

## 2023-09-28 PROCEDURE — 71111 X-RAY EXAM RIBS/CHEST4/> VWS: CPT

## 2023-09-28 PROCEDURE — 73110 X-RAY EXAM OF WRIST: CPT

## 2023-09-28 PROCEDURE — 72072 X-RAY EXAM THORAC SPINE 3VWS: CPT

## 2023-09-28 PROCEDURE — 6370000000 HC RX 637 (ALT 250 FOR IP): Performed by: PHYSICIAN ASSISTANT

## 2023-09-28 PROCEDURE — 73030 X-RAY EXAM OF SHOULDER: CPT

## 2023-09-28 PROCEDURE — 6360000002 HC RX W HCPCS: Performed by: PHYSICIAN ASSISTANT

## 2023-09-28 PROCEDURE — 73060 X-RAY EXAM OF HUMERUS: CPT

## 2023-09-28 RX ORDER — METHOCARBAMOL 750 MG/1
1500 TABLET, FILM COATED ORAL
Status: COMPLETED | OUTPATIENT
Start: 2023-09-28 | End: 2023-09-28

## 2023-09-28 RX ORDER — METHOCARBAMOL 750 MG/1
750 TABLET, FILM COATED ORAL 4 TIMES DAILY
Qty: 20 TABLET | Refills: 0 | Status: SHIPPED | OUTPATIENT
Start: 2023-09-28 | End: 2023-10-03

## 2023-09-28 RX ORDER — DICLOFENAC SODIUM 75 MG/1
75 TABLET, DELAYED RELEASE ORAL 2 TIMES DAILY
Qty: 20 TABLET | Refills: 0 | Status: SHIPPED | OUTPATIENT
Start: 2023-09-28

## 2023-09-28 RX ORDER — HYDROCODONE BITARTRATE AND ACETAMINOPHEN 5; 325 MG/1; MG/1
1 TABLET ORAL EVERY 4 HOURS PRN
Qty: 8 TABLET | Refills: 0 | Status: SHIPPED | OUTPATIENT
Start: 2023-09-28 | End: 2023-10-01

## 2023-09-28 RX ORDER — KETOROLAC TROMETHAMINE 30 MG/ML
30 INJECTION, SOLUTION INTRAMUSCULAR; INTRAVENOUS ONCE
Status: COMPLETED | OUTPATIENT
Start: 2023-09-28 | End: 2023-09-28

## 2023-09-28 RX ORDER — HYDROCODONE BITARTRATE AND ACETAMINOPHEN 5; 325 MG/1; MG/1
1 TABLET ORAL
Status: COMPLETED | OUTPATIENT
Start: 2023-09-28 | End: 2023-09-28

## 2023-09-28 RX ADMIN — HYDROCODONE BITARTRATE AND ACETAMINOPHEN 1 TABLET: 5; 325 TABLET ORAL at 10:34

## 2023-09-28 RX ADMIN — KETOROLAC TROMETHAMINE 30 MG: 30 INJECTION, SOLUTION INTRAMUSCULAR; INTRAVENOUS at 08:34

## 2023-09-28 RX ADMIN — METHOCARBAMOL 1500 MG: 750 TABLET ORAL at 08:34

## 2023-09-28 ASSESSMENT — PAIN - FUNCTIONAL ASSESSMENT: PAIN_FUNCTIONAL_ASSESSMENT: 0-10

## 2023-09-28 ASSESSMENT — LIFESTYLE VARIABLES
HOW OFTEN DO YOU HAVE A DRINK CONTAINING ALCOHOL: NEVER
HOW MANY STANDARD DRINKS CONTAINING ALCOHOL DO YOU HAVE ON A TYPICAL DAY: PATIENT DOES NOT DRINK

## 2023-09-28 ASSESSMENT — PAIN SCALES - GENERAL
PAINLEVEL_OUTOF10: 10
PAINLEVEL_OUTOF10: 9
PAINLEVEL_OUTOF10: 10

## 2023-09-28 NOTE — ED PROVIDER NOTES
Disposition Considerations (Tests not done, Shared Decision Making, Pt Expectation of Test or Treatment.): Not Applicable    Patient was given the following medications:  Medications   ketorolac (TORADOL) injection 30 mg (30 mg IntraMUSCular Given 9/28/23 0834)   methocarbamol (ROBAXIN) tablet 1,500 mg (1,500 mg Oral Given 9/28/23 0834)   HYDROcodone-acetaminophen (Winferd Raddle) 5-325 MG per tablet 1 tablet (1 tablet Oral Given 9/28/23 1034)       CONSULTS: (Who and What was discussed)  None     Social Determinants affecting Dx or Tx: None    Smoking Cessation: Smoking Cessation Counseling  Discussed the risks of smoking tobacco products and the long term sequelae of tobacco use with the patient such as increased risk of heart attack, stroke, peripheral artery disease and cancer. The patient verbalized their understanding and were provided resources if asked. Counseled patient for approximately 3 minutes. PROCEDURES   Unless otherwise noted above, none. Procedures      CRITICAL CARE TIME   Patient does not meet Critical Care Time, 0 minutes    FINAL IMPRESSION     1. Ground-level fall    2. Rib contusion, left, initial encounter    3. Thoracic myofascial strain, initial encounter    4. Left arm pain    5. Paresthesia    6. Anxiety state          DISPOSITION/PLAN   DISPOSITION Decision To Discharge 09/28/2023 09:57:32 AM    Discharge Note: The patient is stable for discharge home. The signs, symptoms, diagnosis, and discharge instructions have been discussed, understanding conveyed, and agreed upon. The patient is to follow up as recommended or return to ER should their symptoms worsen.       PATIENT REFERRED TO:  Juan Chatterjee MD  [facility]  [phone]    Schedule an appointment as soon as possible for a visit   As needed    Adry Calles MD  [id number] [facility]  [phone]      As needed    [address]

## 2023-09-28 NOTE — ED TRIAGE NOTES
States slipped and fell in shower last night, reports left side pain from shoulder down to back. Denies LOC, no thinners, denies hitting head.

## 2024-01-05 ENCOUNTER — ANESTHESIA (OUTPATIENT)
Facility: HOSPITAL | Age: 34
End: 2024-01-05
Payer: MEDICAID

## 2024-01-05 ENCOUNTER — HOSPITAL ENCOUNTER (OUTPATIENT)
Facility: HOSPITAL | Age: 34
Setting detail: OUTPATIENT SURGERY
Discharge: HOME | End: 2024-01-05
Attending: INTERNAL MEDICINE | Admitting: INTERNAL MEDICINE
Payer: MEDICAID

## 2024-01-05 ENCOUNTER — ANESTHESIA EVENT (OUTPATIENT)
Facility: HOSPITAL | Age: 34
End: 2024-01-05
Payer: MEDICAID

## 2024-01-05 VITALS
HEART RATE: 63 BPM | TEMPERATURE: 98.1 F | DIASTOLIC BLOOD PRESSURE: 77 MMHG | SYSTOLIC BLOOD PRESSURE: 123 MMHG | OXYGEN SATURATION: 99 % | RESPIRATION RATE: 18 BRPM

## 2024-01-05 DIAGNOSIS — R10.9 ABDOMINAL PAIN, UNSPECIFIED ABDOMINAL LOCATION: ICD-10-CM

## 2024-01-05 PROCEDURE — 3600007512: Performed by: INTERNAL MEDICINE

## 2024-01-05 PROCEDURE — 2720000010 HC SURG SUPPLY STERILE: Performed by: INTERNAL MEDICINE

## 2024-01-05 PROCEDURE — 7100000010 HC PHASE II RECOVERY - FIRST 15 MIN: Performed by: INTERNAL MEDICINE

## 2024-01-05 PROCEDURE — 6360000002 HC RX W HCPCS: Performed by: NURSE PRACTITIONER

## 2024-01-05 PROCEDURE — 7100000011 HC PHASE II RECOVERY - ADDTL 15 MIN: Performed by: INTERNAL MEDICINE

## 2024-01-05 PROCEDURE — 2500000003 HC RX 250 WO HCPCS: Performed by: NURSE PRACTITIONER

## 2024-01-05 PROCEDURE — 6360000002 HC RX W HCPCS: Performed by: ANESTHESIOLOGY

## 2024-01-05 PROCEDURE — 2580000003 HC RX 258: Performed by: INTERNAL MEDICINE

## 2024-01-05 PROCEDURE — 2709999900 HC NON-CHARGEABLE SUPPLY: Performed by: INTERNAL MEDICINE

## 2024-01-05 PROCEDURE — 3700000001 HC ADD 15 MINUTES (ANESTHESIA): Performed by: INTERNAL MEDICINE

## 2024-01-05 PROCEDURE — 3600007502: Performed by: INTERNAL MEDICINE

## 2024-01-05 PROCEDURE — 88305 TISSUE EXAM BY PATHOLOGIST: CPT

## 2024-01-05 PROCEDURE — 3700000000 HC ANESTHESIA ATTENDED CARE: Performed by: INTERNAL MEDICINE

## 2024-01-05 RX ORDER — GLYCOPYRROLATE 0.2 MG/ML
INJECTION INTRAMUSCULAR; INTRAVENOUS PRN
Status: DISCONTINUED | OUTPATIENT
Start: 2024-01-05 | End: 2024-01-05 | Stop reason: SDUPTHER

## 2024-01-05 RX ORDER — SODIUM CHLORIDE 9 MG/ML
INJECTION, SOLUTION INTRAVENOUS CONTINUOUS
Status: DISCONTINUED | OUTPATIENT
Start: 2024-01-05 | End: 2024-01-05 | Stop reason: HOSPADM

## 2024-01-05 RX ORDER — LIDOCAINE HYDROCHLORIDE 20 MG/ML
INJECTION, SOLUTION EPIDURAL; INFILTRATION; INTRACAUDAL; PERINEURAL PRN
Status: DISCONTINUED | OUTPATIENT
Start: 2024-01-05 | End: 2024-01-05 | Stop reason: SDUPTHER

## 2024-01-05 RX ORDER — SODIUM CHLORIDE, SODIUM LACTATE, POTASSIUM CHLORIDE, CALCIUM CHLORIDE 600; 310; 30; 20 MG/100ML; MG/100ML; MG/100ML; MG/100ML
INJECTION, SOLUTION INTRAVENOUS CONTINUOUS
Status: DISCONTINUED | OUTPATIENT
Start: 2024-01-05 | End: 2024-01-05 | Stop reason: HOSPADM

## 2024-01-05 RX ORDER — ONDANSETRON 2 MG/ML
4 INJECTION INTRAMUSCULAR; INTRAVENOUS ONCE
Status: COMPLETED | OUTPATIENT
Start: 2024-01-05 | End: 2024-01-05

## 2024-01-05 RX ADMIN — PROPOFOL 50 MG: 10 INJECTION, EMULSION INTRAVENOUS at 11:07

## 2024-01-05 RX ADMIN — PROPOFOL 50 MG: 10 INJECTION, EMULSION INTRAVENOUS at 11:04

## 2024-01-05 RX ADMIN — ONDANSETRON 4 MG: 2 INJECTION INTRAMUSCULAR; INTRAVENOUS at 12:19

## 2024-01-05 RX ADMIN — LIDOCAINE HYDROCHLORIDE 100 MG: 20 INJECTION, SOLUTION EPIDURAL; INFILTRATION; INTRACAUDAL; PERINEURAL at 10:58

## 2024-01-05 RX ADMIN — PROPOFOL 100 MG: 10 INJECTION, EMULSION INTRAVENOUS at 11:00

## 2024-01-05 RX ADMIN — PROPOFOL 50 MG: 10 INJECTION, EMULSION INTRAVENOUS at 11:14

## 2024-01-05 RX ADMIN — PROPOFOL 50 MG: 10 INJECTION, EMULSION INTRAVENOUS at 11:08

## 2024-01-05 RX ADMIN — PROPOFOL 100 MG: 10 INJECTION, EMULSION INTRAVENOUS at 11:02

## 2024-01-05 RX ADMIN — SODIUM CHLORIDE, POTASSIUM CHLORIDE, SODIUM LACTATE AND CALCIUM CHLORIDE: 600; 310; 30; 20 INJECTION, SOLUTION INTRAVENOUS at 10:32

## 2024-01-05 RX ADMIN — GLYCOPYRROLATE 0.2 MG: 0.2 INJECTION INTRAMUSCULAR; INTRAVENOUS at 10:58

## 2024-01-05 ASSESSMENT — PAIN - FUNCTIONAL ASSESSMENT
PAIN_FUNCTIONAL_ASSESSMENT: 0-10
PAIN_FUNCTIONAL_ASSESSMENT: WONG-BAKER FACES

## 2024-01-05 NOTE — ANESTHESIA PRE PROCEDURE
CREATININE 1.03 02/23/2023 11:26 AM    GFRAA >60 10/23/2021 08:45 PM    AGRATIO 0.9 02/23/2023 11:26 AM    GLUCOSE 97 02/23/2023 11:26 AM    PROT 7.6 02/23/2023 11:26 AM    CALCIUM 9.3 02/23/2023 11:26 AM    BILITOT 0.3 02/23/2023 11:26 AM    ALKPHOS 99 02/23/2023 11:26 AM    AST 7 02/23/2023 11:26 AM    ALT 17 02/23/2023 11:26 AM       POC Tests: No results for input(s): \"POCGLU\", \"POCNA\", \"POCK\", \"POCCL\", \"POCBUN\", \"POCHEMO\", \"POCHCT\" in the last 72 hours.    Coags: No results found for: \"PROTIME\", \"INR\", \"APTT\"    HCG (If Applicable): No results found for: \"PREGTESTUR\", \"PREGSERUM\", \"HCG\", \"HCGQUANT\"     ABGs: No results found for: \"PHART\", \"PO2ART\", \"YGT3WDU\", \"OOP4YWV\", \"BEART\", \"R3REVDHK\"     Type & Screen (If Applicable):  No results found for: \"LABABO\", \"LABRH\"    Drug/Infectious Status (If Applicable):  No results found for: \"HIV\", \"HEPCAB\"    COVID-19 Screening (If Applicable):   Lab Results   Component Value Date/Time    COVID19 Please find results under separate order 12/06/2021 09:09 AM    COVID19 Not detected 12/06/2021 09:09 AM           Anesthesia Evaluation  Patient summary reviewed and Nursing notes reviewed  Airway: Mallampati: II  TM distance: >3 FB   Neck ROM: full  Mouth opening: > = 3 FB   Dental: normal exam         Pulmonary:normal exam  breath sounds clear to auscultation  (+)  COPD:    sleep apnea:       asthma:                            Cardiovascular:Negative CV ROS          ECG reviewed  Rhythm: regular  Rate: normal  Echocardiogram reviewed               ROS comment: TTE 2022:  Normal left ventricular dimensions and systolic function.   LV ejection fraction = 55-60%.   Normal left ventricular diastolic function.   The right ventricle is normal in size and function.   There is trivial mitral regurgitation.   There is trace tricuspid regurgitation.   Trace pulmonic valvular regurgitation.   There is no pericardial effusion.        Neuro/Psych:   (+) psychiatric

## 2024-01-05 NOTE — ANESTHESIA POSTPROCEDURE EVALUATION
Department of Anesthesiology  Postprocedure Note    Patient: Ed Ledezma  MRN: [id number]  YOB: 1990  Date of evaluation: 1/5/2024    Procedure Summary       Date: 01/05/24 Room / Location: [facility] ENDO 03 / [facility] ENDOSCOPY    Anesthesia Start: 1055 Anesthesia Stop: 1120    Procedures:       COLONOSCOPY, EGD (Lower GI Region)      EGD ESOPHAGOGASTRODUODENOSCOPY WITH 48 HOUR PH PROBE PLACEMENT (Upper GI Region) Diagnosis:       Abdominal pain, unspecified abdominal location      (Abdominal pain, unspecified abdominal location [R10.9])    Surgeons: Mart Garces MD Responsible Provider: Casey Holman MD    Anesthesia Type: MAC, TIVA ASA Status: 2            Anesthesia Type: No value filed.    Mine Phase I: Mine Score: 10    Mine Phase II:      Anesthesia Post Evaluation    Patient location during evaluation: bedside (Endoscopy Unit)  Patient participation: complete - patient participated  Level of consciousness: sleepy but conscious  Pain score: 0  Airway patency: patent  Nausea & Vomiting: no nausea and no vomiting  Cardiovascular status: hemodynamically stable  Respiratory status: acceptable  Hydration status: stable  Comments: This patient remained on the stretcher.  The patient was handed off to the endoscopy nursing team.  All questions regarding pre-, intra-, and postoperative care were answered.  Multimodal analgesia pain management approach    No notable events documented.

## 2024-01-05 NOTE — PROGRESS NOTES
IV removed-- tip intact, no redness, swelling or bleeding noted. VSS. Patient in no acute distress. DC instructions reviewed with patient and brother       -- verbalized understanding. Patient wheeled out to private vehicle-- no distress noted.     PH Poon box instructions with patient.     Report given.     Patient voided and tolerated small sips of water prior to discharge.

## 2024-01-08 NOTE — PROCEDURES
Ed Ledezma is a 33 y.o. female patient.  1. Abdominal pain, unspecified abdominal location      Past Medical History:   Diagnosis Date    ADHD     Asthma     Bipolar disorder (HCC)     Chronic obstructive pulmonary disease (HCC)     Depression     PTSD (post-traumatic stress disorder)     Sleep apnea      Blood pressure 123/77, pulse 63, temperature 98.1 °F (36.7 °C), temperature source Oral, resp. rate 18, SpO2 99 %.    Procedures  Poon ph study    History of physical performed prior procedure, indication, risks discussed with patient   Patient signed  consent form  pH probe placed by the EGD''  pH study was done patient off PPI for days,  Ambulatory pH monitory performed by using a Bravo pH System,, total study time was 48 hours.  Data retracted and analyzed by computer by myself.      S exposure time was 34.4 %  DeMeester score was 158.6, which was abnormal indicating positive study    the symptoms index for reflux was 100%  Symptoms associated probability was 58    In conclusion,   patient's acid reflux /heartburn and related symptom,consistent with acid reflux,    Recommendation:    Restart on PPI twice a day dosage and the with H2 blocker BID Dosage  Acid reflux Measures, small meals,  Follow-up after 2 months    Should patient symptom persist despite of maximal of PPI and H2 blocker treatment, patient may be beneficial for the surgical intervention      Mart Garces MD  1/8/2024

## 2024-04-04 ENCOUNTER — HOSPITAL ENCOUNTER (EMERGENCY)
Facility: HOSPITAL | Age: 34
Discharge: HOME | End: 2024-04-04
Attending: EMERGENCY MEDICINE
Payer: MEDICAID

## 2024-04-04 VITALS
DIASTOLIC BLOOD PRESSURE: 83 MMHG | WEIGHT: 205 LBS | HEIGHT: 67 IN | TEMPERATURE: 98.2 F | BODY MASS INDEX: 32.18 KG/M2 | RESPIRATION RATE: 16 BRPM | OXYGEN SATURATION: 100 % | SYSTOLIC BLOOD PRESSURE: 121 MMHG | HEART RATE: 78 BPM

## 2024-04-04 DIAGNOSIS — G56.02 CARPAL TUNNEL SYNDROME OF LEFT WRIST: Primary | ICD-10-CM

## 2024-04-04 PROCEDURE — 99284 EMERGENCY DEPT VISIT MOD MDM: CPT

## 2024-04-04 PROCEDURE — 96372 THER/PROPH/DIAG INJ SC/IM: CPT

## 2024-04-04 PROCEDURE — 6360000002 HC RX W HCPCS

## 2024-04-04 RX ORDER — KETOROLAC TROMETHAMINE 30 MG/ML
30 INJECTION, SOLUTION INTRAMUSCULAR; INTRAVENOUS
Status: COMPLETED | OUTPATIENT
Start: 2024-04-04 | End: 2024-04-04

## 2024-04-04 RX ORDER — PREDNISONE 20 MG/1
20 TABLET ORAL DAILY
Qty: 10 TABLET | Refills: 0 | Status: SHIPPED | OUTPATIENT
Start: 2024-04-04 | End: 2024-04-14

## 2024-04-04 RX ORDER — NAPROXEN 500 MG/1
500 TABLET ORAL 2 TIMES DAILY PRN
Qty: 60 TABLET | Refills: 0 | Status: SHIPPED | OUTPATIENT
Start: 2024-04-04

## 2024-04-04 RX ADMIN — KETOROLAC TROMETHAMINE 30 MG: 30 INJECTION, SOLUTION INTRAMUSCULAR; INTRAVENOUS at 15:17

## 2024-04-04 ASSESSMENT — PAIN - FUNCTIONAL ASSESSMENT
PAIN_FUNCTIONAL_ASSESSMENT: 0-10
PAIN_FUNCTIONAL_ASSESSMENT: 0-10

## 2024-04-04 ASSESSMENT — PAIN SCALES - GENERAL
PAINLEVEL_OUTOF10: 10
PAINLEVEL_OUTOF10: 10

## 2024-04-04 ASSESSMENT — LIFESTYLE VARIABLES
HOW MANY STANDARD DRINKS CONTAINING ALCOHOL DO YOU HAVE ON A TYPICAL DAY: PATIENT DOES NOT DRINK
HOW OFTEN DO YOU HAVE A DRINK CONTAINING ALCOHOL: NEVER

## 2024-04-04 NOTE — ED PROVIDER NOTES
Discharge Medication List          I am the Primary Clinician of Record. MARIELENA Osman (electronically signed)     Sarah Ayon, PA  04/04/24 [id number]

## 2024-04-04 NOTE — ED TRIAGE NOTES
[facility] 15 pt stated that she has had pain that started about 2 weeks ago to her left hand index finger that has gradually get worse; pt stated that her hand would turn purple and blue; c/o pain with movement of her digits; while in triage right radial pulse is stronger than left radial pulse

## 2024-10-23 ENCOUNTER — APPOINTMENT (OUTPATIENT)
Facility: HOSPITAL | Age: 34
End: 2024-10-23
Payer: MEDICAID

## 2024-10-23 ENCOUNTER — HOSPITAL ENCOUNTER (EMERGENCY)
Facility: HOSPITAL | Age: 34
Discharge: HOME | End: 2024-10-23
Payer: MEDICAID

## 2024-10-23 VITALS
BODY MASS INDEX: 32.96 KG/M2 | SYSTOLIC BLOOD PRESSURE: 127 MMHG | WEIGHT: 210 LBS | OXYGEN SATURATION: 100 % | TEMPERATURE: 99 F | HEIGHT: 67 IN | DIASTOLIC BLOOD PRESSURE: 97 MMHG | RESPIRATION RATE: 16 BRPM | HEART RATE: 79 BPM

## 2024-10-23 DIAGNOSIS — K08.89 PAIN, DENTAL: Primary | ICD-10-CM

## 2024-10-23 DIAGNOSIS — E83.51 HYPOCALCEMIA: ICD-10-CM

## 2024-10-23 LAB
ANION GAP SERPL CALC-SCNC: 8 MMOL/L (ref 2–12)
BASOPHILS # BLD: 0 K/UL (ref 0–0.1)
BASOPHILS NFR BLD: 0 % (ref 0–1)
BUN SERPL-MCNC: 8 MG/DL (ref 6–20)
BUN/CREAT SERPL: 12 (ref 12–20)
CA-I BLD-MCNC: 7.7 MG/DL (ref 8.5–10.1)
CHLORIDE SERPL-SCNC: 111 MMOL/L (ref 97–108)
CO2 SERPL-SCNC: 22 MMOL/L (ref 21–32)
CREAT SERPL-MCNC: 0.66 MG/DL (ref 0.55–1.02)
DIFFERENTIAL METHOD BLD: ABNORMAL
EOSINOPHIL # BLD: 0.1 K/UL (ref 0–0.4)
EOSINOPHIL NFR BLD: 1 % (ref 0–7)
ERYTHROCYTE [DISTWIDTH] IN BLOOD BY AUTOMATED COUNT: 14.6 % (ref 11.5–14.5)
GLUCOSE SERPL-MCNC: 80 MG/DL (ref 65–100)
HCT VFR BLD AUTO: 40.6 % (ref 35–47)
HGB BLD-MCNC: 13.1 G/DL (ref 11.5–16)
IMM GRANULOCYTES # BLD AUTO: 0 K/UL (ref 0–0.04)
IMM GRANULOCYTES NFR BLD AUTO: 0 % (ref 0–0.5)
LYMPHOCYTES # BLD: 2.4 K/UL (ref 0.8–3.5)
LYMPHOCYTES NFR BLD: 27 % (ref 12–49)
MCH RBC QN AUTO: 27.1 PG (ref 26–34)
MCHC RBC AUTO-ENTMCNC: 32.3 G/DL (ref 30–36.5)
MCV RBC AUTO: 83.9 FL (ref 80–99)
MONOCYTES # BLD: 0.7 K/UL (ref 0–1)
MONOCYTES NFR BLD: 8 % (ref 5–13)
NEUTS SEG # BLD: 5.7 K/UL (ref 1.8–8)
NEUTS SEG NFR BLD: 64 % (ref 32–75)
NRBC # BLD: 0 K/UL (ref 0–0.01)
NRBC BLD-RTO: 0 PER 100 WBC
PLATELET # BLD AUTO: 244 K/UL (ref 150–400)
PMV BLD AUTO: 10.4 FL (ref 8.9–12.9)
POTASSIUM SERPL-SCNC: 3.7 MMOL/L (ref 3.5–5.1)
RBC # BLD AUTO: 4.84 M/UL (ref 3.8–5.2)
SODIUM SERPL-SCNC: 141 MMOL/L (ref 136–145)
WBC # BLD AUTO: 8.9 K/UL (ref 3.6–11)

## 2024-10-23 PROCEDURE — 6360000004 HC RX CONTRAST MEDICATION

## 2024-10-23 PROCEDURE — 96374 THER/PROPH/DIAG INJ IV PUSH: CPT

## 2024-10-23 PROCEDURE — 36415 COLL VENOUS BLD VENIPUNCTURE: CPT

## 2024-10-23 PROCEDURE — 96375 TX/PRO/DX INJ NEW DRUG ADDON: CPT

## 2024-10-23 PROCEDURE — 6370000000 HC RX 637 (ALT 250 FOR IP)

## 2024-10-23 PROCEDURE — 70487 CT MAXILLOFACIAL W/DYE: CPT

## 2024-10-23 PROCEDURE — 99285 EMERGENCY DEPT VISIT HI MDM: CPT

## 2024-10-23 PROCEDURE — 6360000002 HC RX W HCPCS

## 2024-10-23 PROCEDURE — 85025 COMPLETE CBC W/AUTO DIFF WBC: CPT

## 2024-10-23 PROCEDURE — 80048 BASIC METABOLIC PNL TOTAL CA: CPT

## 2024-10-23 RX ORDER — ONDANSETRON 2 MG/ML
4 INJECTION INTRAMUSCULAR; INTRAVENOUS ONCE
Status: COMPLETED | OUTPATIENT
Start: 2024-10-23 | End: 2024-10-23

## 2024-10-23 RX ORDER — OXYCODONE AND ACETAMINOPHEN 5; 325 MG/1; MG/1
1 TABLET ORAL
Status: COMPLETED | OUTPATIENT
Start: 2024-10-23 | End: 2024-10-23

## 2024-10-23 RX ORDER — OXYCODONE AND ACETAMINOPHEN 5; 325 MG/1; MG/1
1 TABLET ORAL EVERY 8 HOURS PRN
Qty: 6 TABLET | Refills: 0 | Status: SHIPPED | OUTPATIENT
Start: 2024-10-23 | End: 2024-10-25

## 2024-10-23 RX ORDER — KETOROLAC TROMETHAMINE 30 MG/ML
30 INJECTION, SOLUTION INTRAMUSCULAR; INTRAVENOUS
Status: COMPLETED | OUTPATIENT
Start: 2024-10-23 | End: 2024-10-23

## 2024-10-23 RX ORDER — IOPAMIDOL 755 MG/ML
100 INJECTION, SOLUTION INTRAVASCULAR
Status: COMPLETED | OUTPATIENT
Start: 2024-10-23 | End: 2024-10-23

## 2024-10-23 RX ADMIN — OXYCODONE AND ACETAMINOPHEN 1 TABLET: 5; 325 TABLET ORAL at 16:16

## 2024-10-23 RX ADMIN — ONDANSETRON 4 MG: 2 INJECTION INTRAMUSCULAR; INTRAVENOUS at 18:16

## 2024-10-23 RX ADMIN — IOPAMIDOL 100 ML: 755 INJECTION, SOLUTION INTRAVENOUS at 17:40

## 2024-10-23 RX ADMIN — KETOROLAC TROMETHAMINE 30 MG: 30 INJECTION INTRAMUSCULAR; INTRAVENOUS at 17:54

## 2024-10-23 ASSESSMENT — PAIN - FUNCTIONAL ASSESSMENT
PAIN_FUNCTIONAL_ASSESSMENT: 0-10

## 2024-10-23 ASSESSMENT — PAIN SCALES - GENERAL
PAINLEVEL_OUTOF10: 10

## 2024-10-23 NOTE — DISCHARGE INSTRUCTIONS
10.1 mg/dL       Radiologic Studies  CT MAXILLOFACIAL W CONTRAST   Final Result   No maxillofacial abscess or inflammation.      Electronically signed by DENEEN BUTCHER        ------------------------------------------------------------------------------------------------------------  The evaluation and treatment you received in the Emergency Department were for an urgent problem. It is important that you follow-up with a doctor, nurse practitioner, or physician assistant to:  (1) confirm your diagnosis,  (2) re-evaluation of changes in your illness and treatment, and (3) for ongoing care. Please take your discharge instructions with you when you go to your follow-up appointment.     If you have any problem arranging a follow-up appointment, contact us!  If your symptoms become worse or you do not improve as expected, please return to us. We are available 24 hours a day.     If a prescription has been provided, please fill it as soon as possible to prevent a delay in treatment. If you have any questions or reservations about taking the medication due to side effects or interactions with other medications, please call your primary care provider or contact us directly.  Again, THANK YOU for choosing us to care for YOU!

## 2024-10-23 NOTE — ED TRIAGE NOTES
Pt had an abscess drained on the right side of mouth yesterday and today it is swollen and painful. Pt is compliant with antibiotics

## 2024-10-23 NOTE — ED PROVIDER NOTES
[facility] EMERGENCY DEPT  EMERGENCY DEPARTMENT HISTORY AND PHYSICAL EXAM      Date: 10/23/2024  Patient Name: Ed Ledezma  MRN: [id number]  Birthdate 1990  Date of evaluation: 10/23/2024  Provider: MARIELENA Osman   Note Started: 3:40 PM EDT 10/23/24    HISTORY OF PRESENT ILLNESS     Chief Complaint   Patient presents with    Oral Swelling    Mouth Pain     History Provided By: Patient    HPI: Ed Ledezma is a 34 y.o. female with past medical history as listed below who presents to the ED complaining of facial pain and swelling x 1 day. She states that she recently had a tooth extracted by her dentist, and had to have an abscess drained yesterday above the right upper tooth. She was started on clindamycin and has been taking it as prescribed. She woke up this morning with worsening facial pain and swelling on the right side, so she called her dentist who recommended that she come to the emergency department for evaluation.  She denies any fever/chills, chest pain, shortness of breath, or difficulty swallowing.     PAST MEDICAL HISTORY   Past Medical History:  Past Medical History:   Diagnosis Date    ADHD     Asthma     Bipolar disorder (HCC)     Chronic obstructive pulmonary disease (HCC)     Depression     PTSD (post-traumatic stress disorder)     Sleep apnea        Past Surgical History:  Past Surgical History:   Procedure Laterality Date    COLONOSCOPY N/A 12/9/2021    COLONOSCOPY WITH POSSIBLE BIPSY AND UPPER ENDOSCOPY performed by Mart Garces MD at [facility] ENDOSCOPY    COLONOSCOPY N/A 1/5/2024    COLONOSCOPY, EGD performed by Mart Garces MD at [facility] ENDOSCOPY    EP DEVICE PROCEDURE N/A 7/24/2023    Loop recorder insert performed by Hakeem Xavier MD at [facility] CARDIAC CATH LAB    PELVIC LAPAROSCOPY      Endometriosis    TONSILLECTOMY      UPPER GASTROINTESTINAL ENDOSCOPY N/A 1/5/2024    EGD ESOPHAGOGASTRODUODENOSCOPY WITH 48 HOUR PH PROBE PLACEMENT performed by Mart Garces MD at [facility] ENDOSCOPY       Family

## 2025-02-06 ENCOUNTER — APPOINTMENT (OUTPATIENT)
Facility: HOSPITAL | Age: 35
End: 2025-02-06
Payer: MEDICAID

## 2025-02-06 ENCOUNTER — HOSPITAL ENCOUNTER (EMERGENCY)
Facility: HOSPITAL | Age: 35
Discharge: HOME | End: 2025-02-06
Payer: MEDICAID

## 2025-02-06 VITALS
RESPIRATION RATE: 18 BRPM | DIASTOLIC BLOOD PRESSURE: 78 MMHG | OXYGEN SATURATION: 100 % | BODY MASS INDEX: 32.58 KG/M2 | HEIGHT: 68 IN | SYSTOLIC BLOOD PRESSURE: 122 MMHG | WEIGHT: 215 LBS | HEART RATE: 82 BPM | TEMPERATURE: 98.2 F

## 2025-02-06 DIAGNOSIS — S93.402A SPRAIN OF LEFT ANKLE, UNSPECIFIED LIGAMENT, INITIAL ENCOUNTER: Primary | ICD-10-CM

## 2025-02-06 PROCEDURE — 99284 EMERGENCY DEPT VISIT MOD MDM: CPT

## 2025-02-06 PROCEDURE — 6360000002 HC RX W HCPCS: Performed by: NURSE PRACTITIONER

## 2025-02-06 PROCEDURE — 96372 THER/PROPH/DIAG INJ SC/IM: CPT

## 2025-02-06 PROCEDURE — 73610 X-RAY EXAM OF ANKLE: CPT

## 2025-02-06 PROCEDURE — 73630 X-RAY EXAM OF FOOT: CPT

## 2025-02-06 RX ORDER — KETOROLAC TROMETHAMINE 30 MG/ML
30 INJECTION, SOLUTION INTRAMUSCULAR; INTRAVENOUS
Status: COMPLETED | OUTPATIENT
Start: 2025-02-06 | End: 2025-02-06

## 2025-02-06 RX ORDER — CYCLOBENZAPRINE HCL 10 MG
10 TABLET ORAL 3 TIMES DAILY PRN
Qty: 21 TABLET | Refills: 0 | Status: SHIPPED | OUTPATIENT
Start: 2025-02-06 | End: 2025-02-16

## 2025-02-06 RX ORDER — OXYCODONE AND ACETAMINOPHEN 5; 325 MG/1; MG/1
1 TABLET ORAL EVERY 8 HOURS PRN
Qty: 9 TABLET | Refills: 0 | Status: SHIPPED | OUTPATIENT
Start: 2025-02-06 | End: 2025-02-09

## 2025-02-06 RX ADMIN — KETOROLAC TROMETHAMINE 30 MG: 30 INJECTION, SOLUTION INTRAMUSCULAR at 11:54

## 2025-02-06 ASSESSMENT — PAIN - FUNCTIONAL ASSESSMENT: PAIN_FUNCTIONAL_ASSESSMENT: 0-10

## 2025-02-06 ASSESSMENT — PAIN SCALES - GENERAL
PAINLEVEL_OUTOF10: 7
PAINLEVEL_OUTOF10: 10

## 2025-02-06 NOTE — ED PROVIDER NOTES
[facility] EMERGENCY DEPT  EMERGENCY DEPARTMENT HISTORY AND PHYSICAL EXAM      Date: 2/6/2025  Patient Name: Ed Ledezma  MRN: [id number]  YOB: 1990  Date of evaluation: 2/6/2025  Provider: LETICIA Hoyos CNP   Note Started: 11:32 AM EST 2/6/25    HISTORY OF PRESENT ILLNESS     Chief Complaint   Patient presents with    Ankle Pain       History Provided By: Patient    HPI: Ed Ledezma is a 34 y.o. female presents to the emergency department with chief complaint of left ankle pain.  She states that she was painting and was on a 3 step ladder.  She states that she went to step down and missed the bottom 2 steps and came down on her left foot.  She states that foot rolled inward and she came down on her ankle.  She had swelling and pain, states that she has had ankle injuries in the past so has been using over-the-counter pain medication as well as Ace bandage however the pain is continued to get worse.  Denies any loss of sensation to the toes.  Also endorses pain down the lateral aspect of the foot.  She denies any lightheadedness, dizziness, headaches, chest pain, palpitations, abdominal pain, nausea, vomiting, diarrhea, constipation.    PAST MEDICAL HISTORY   Past Medical History:  Past Medical History:   Diagnosis Date    ADHD     Asthma     Bipolar disorder (HCC)     Chronic obstructive pulmonary disease (HCC)     Depression     PTSD (post-traumatic stress disorder)     Sleep apnea        Past Surgical History:  Past Surgical History:   Procedure Laterality Date    COLONOSCOPY N/A 12/9/2021    COLONOSCOPY WITH POSSIBLE BIPSY AND UPPER ENDOSCOPY performed by Mart Garces MD at [facility] ENDOSCOPY    COLONOSCOPY N/A 1/5/2024    COLONOSCOPY, EGD performed by Mart Garces MD at [facility] ENDOSCOPY    EP DEVICE PROCEDURE N/A 7/24/2023    Loop recorder insert performed by Hakeem Xavier MD at [facility] CARDIAC CATH LAB    PELVIC LAPAROSCOPY      Endometriosis    TONSILLECTOMY      UPPER GASTROINTESTINAL ENDOSCOPY N/A

## 2025-02-06 NOTE — ED TRIAGE NOTES
PT ARRIVED WITH LEFT ANKLE PAIN X 3 DAYS, AFTER FALLING OFF LADDER, ALERT ORIENTED AND AMBULATORY ON ARRIVAL

## 2025-02-06 NOTE — DISCHARGE INSTRUCTIONS
Thank you for choosing our Emergency Department for your care.  It is our privilege to care for you in your time of need.  In the next several days, you may receive a survey via email or mailed to your home about your experience with our team.  We would greatly appreciate you taking a few minutes to complete the survey, as we use this information to learn what we have done well and what we could be doing better. Thank you for trusting us with your care!    Below you will find a list of your tests from today's visit.   Labs and Radiology Studies  No results found for this or any previous visit (from the past 12 hour(s)).  XR FOOT LEFT (MIN 3 VIEWS)    Result Date: 2/6/2025  EXAM: XR FOOT LEFT (MIN 3 VIEWS) INDICATION: pain after fall from ladder. Reason for exam:->pain after fall from ladder. COMPARISON: Left foot radiographs 5/26/2019. FINDINGS: Three views of the left foot demonstrate no fracture or other acute osseous or articular abnormality. The soft tissues are within normal limits.     No acute abnormality. Electronically signed by ARI RAMOS    XR ANKLE LEFT (MIN 3 VIEWS)    Result Date: 2/6/2025  INDICATION:  pain after fall from ladder Exam: AP, lateral, oblique views of the left ankle. FINDINGS: There is no acute fracture or dislocation. Ankle mortise is congruent. Soft tissues are normal. Bones are well-mineralized.     No acute fracture or dislocation. Electronically signed by Matt Grant MD    ------------------------------------------------------------------------------------------------------------  The evaluation and treatment you received in the Emergency Department were for an urgent problem. It is important that you follow-up with a doctor, nurse practitioner, or physician assistant to:  (1) confirm your diagnosis,  (2) re-evaluation of changes in your illness and treatment, and (3) for ongoing care. Please take your discharge instructions with you when you go to your follow-up appointment.      If you have any problem arranging a follow-up appointment, contact us!  If your symptoms become worse or you do not improve as expected, please return to us. We are available 24 hours a day.     If a prescription has been provided, please fill it as soon as possible to prevent a delay in treatment. If you have any questions or reservations about taking the medication due to side effects or interactions with other medications, please call your primary care provider or contact us directly.  Again, THANK YOU for choosing us to care for YOU!

## 2025-06-14 ENCOUNTER — APPOINTMENT (OUTPATIENT)
Facility: HOSPITAL | Age: 35
End: 2025-06-14
Payer: MEDICAID

## 2025-06-14 ENCOUNTER — HOSPITAL ENCOUNTER (EMERGENCY)
Facility: HOSPITAL | Age: 35
Discharge: HOME | End: 2025-06-14
Attending: STUDENT IN AN ORGANIZED HEALTH CARE EDUCATION/TRAINING PROGRAM
Payer: MEDICAID

## 2025-06-14 VITALS
SYSTOLIC BLOOD PRESSURE: 133 MMHG | RESPIRATION RATE: 18 BRPM | DIASTOLIC BLOOD PRESSURE: 99 MMHG | OXYGEN SATURATION: 100 % | WEIGHT: 219 LBS | TEMPERATURE: 98.6 F | BODY MASS INDEX: 33.19 KG/M2 | HEIGHT: 68 IN | HEART RATE: 101 BPM

## 2025-06-14 DIAGNOSIS — S90.31XA CONTUSION OF RIGHT FOOT, INITIAL ENCOUNTER: Primary | ICD-10-CM

## 2025-06-14 PROCEDURE — 99283 EMERGENCY DEPT VISIT LOW MDM: CPT

## 2025-06-14 PROCEDURE — 73630 X-RAY EXAM OF FOOT: CPT

## 2025-06-14 PROCEDURE — 6370000000 HC RX 637 (ALT 250 FOR IP): Performed by: STUDENT IN AN ORGANIZED HEALTH CARE EDUCATION/TRAINING PROGRAM

## 2025-06-14 RX ORDER — GABAPENTIN 300 MG/1
CAPSULE ORAL
COMMUNITY

## 2025-06-14 RX ORDER — IBUPROFEN 600 MG/1
600 TABLET, FILM COATED ORAL 3 TIMES DAILY PRN
Qty: 30 TABLET | Refills: 0 | Status: SHIPPED | OUTPATIENT
Start: 2025-06-14

## 2025-06-14 RX ORDER — IBUPROFEN 600 MG/1
600 TABLET, FILM COATED ORAL
Status: COMPLETED | OUTPATIENT
Start: 2025-06-14 | End: 2025-06-14

## 2025-06-14 RX ADMIN — IBUPROFEN 600 MG: 600 TABLET ORAL at 10:22

## 2025-06-14 ASSESSMENT — PAIN SCALES - GENERAL
PAINLEVEL_OUTOF10: 10
PAINLEVEL_OUTOF10: 10

## 2025-06-14 ASSESSMENT — PAIN - FUNCTIONAL ASSESSMENT: PAIN_FUNCTIONAL_ASSESSMENT: 0-10

## 2025-06-14 NOTE — ED PROVIDER NOTES
[facility] EMERGENCY DEPT  EMERGENCY DEPARTMENT HISTORY AND PHYSICAL EXAM      Date of evaluation: 6/14/2025  Patient Name: Ed Ledezma  Birthdate 1990  MRN: [id number]  ED Provider: Angel Calvin MD   Note Started: 10:35 AM EDT 6/14/25    HISTORY OF PRESENT ILLNESS     Chief Complaint   Patient presents with    Foot Injury       History Provided By: Patient, only     HPI: Ed Ledezma is a 34 y.o. female presents to the ED for R foot pain, was moving chest of drawers when a shelf full of close fell out of a drawer and onto her right foot.  Patient complaining of bruising, swelling, inability to ambulate secondary to pain.  Denies any numbness tingling extremities, denies any other injuries.    PAST MEDICAL HISTORY   Past Medical History:  Past Medical History:   Diagnosis Date    ADHD     Asthma     Bipolar disorder (HCC)     Chronic obstructive pulmonary disease (HCC)     Depression     PTSD (post-traumatic stress disorder)     Sleep apnea        Past Surgical History:  Past Surgical History:   Procedure Laterality Date    COLONOSCOPY N/A 12/9/2021    COLONOSCOPY WITH POSSIBLE BIPSY AND UPPER ENDOSCOPY performed by Mart Garces MD at [facility] ENDOSCOPY    COLONOSCOPY N/A 1/5/2024    COLONOSCOPY, EGD performed by Mart Garces MD at [facility] ENDOSCOPY    EP DEVICE PROCEDURE N/A 7/24/2023    Loop recorder insert performed by Hakeem Xavier MD at [facility] CARDIAC CATH LAB    PELVIC LAPAROSCOPY      Endometriosis    TONSILLECTOMY      UPPER GASTROINTESTINAL ENDOSCOPY N/A 1/5/2024    EGD ESOPHAGOGASTRODUODENOSCOPY WITH 48 HOUR PH PROBE PLACEMENT performed by Mart Garces MD at [facility] ENDOSCOPY       Family History:  Family History   Problem Relation Age of Onset    Cancer Mother     Heart Disease Father        Social History:  Social History     Tobacco Use    Smoking status: Former     Current packs/day: 0.00     Types: Cigarettes     Quit date: 1/31/2022     Years since quitting: 3.3    Smokeless tobacco: Never   Vaping Use     MEDICATIONS:     Medication List        ASK your doctor about these medications      ALPRAZolam 1 MG tablet  Commonly known as: XANAX     amphetamine-dextroamphetamine 10 MG extended release capsule  Commonly known as: ADDERALL XR     butalbital-APAP-caffeine -40 MG Caps per capsule     cetirizine 10 MG tablet  Commonly known as: ZYRTEC     diclofenac 75 MG EC tablet  Commonly known as: VOLTAREN  Take 1 tablet by mouth 2 times daily     famotidine 40 MG tablet  Commonly known as: PEPCID     gabapentin 300 MG capsule  Commonly known as: NEURONTIN     naproxen 500 MG tablet  Commonly known as: NAPROSYN  Take 1 tablet by mouth 2 times daily as needed for Pain     ondansetron 4 MG disintegrating tablet  Commonly known as: ZOFRAN-ODT     ondansetron 4 MG tablet  Commonly known as: ZOFRAN     polyethylene glycol 17 GM/SCOOP powder  Commonly known as: GLYCOLAX     propranolol 10 MG tablet  Commonly known as: INDERAL                DISCONTINUED MEDICATIONS:  Current Discharge Medication List          I am the Primary Clinician of Record. Angel Calvin MD (electronically signed)    (Please note that parts of this dictation were completed with voice recognition software. Quite often unanticipated grammatical, syntax, homophones, and other interpretive errors are inadvertently transcribed by the computer software. Please disregards these errors. Please excuse any errors that have escaped final proofreading.)

## 2025-07-19 ENCOUNTER — HOSPITAL ENCOUNTER (EMERGENCY)
Facility: HOSPITAL | Age: 35
Discharge: HOME | End: 2025-07-19
Attending: EMERGENCY MEDICINE
Payer: MEDICAID

## 2025-07-19 ENCOUNTER — APPOINTMENT (OUTPATIENT)
Facility: HOSPITAL | Age: 35
End: 2025-07-19
Payer: MEDICAID

## 2025-07-19 VITALS
SYSTOLIC BLOOD PRESSURE: 123 MMHG | DIASTOLIC BLOOD PRESSURE: 81 MMHG | BODY MASS INDEX: 34.53 KG/M2 | RESPIRATION RATE: 16 BRPM | WEIGHT: 220 LBS | OXYGEN SATURATION: 99 % | HEIGHT: 67 IN | TEMPERATURE: 98.1 F | HEART RATE: 73 BPM

## 2025-07-19 DIAGNOSIS — R10.84 GENERALIZED ABDOMINAL PAIN: ICD-10-CM

## 2025-07-19 DIAGNOSIS — R19.7 DIARRHEA, UNSPECIFIED TYPE: Primary | ICD-10-CM

## 2025-07-19 LAB
ALBUMIN SERPL-MCNC: 4 G/DL (ref 3.5–5)
ALBUMIN/GLOB SERPL: 0.9 (ref 1.1–2.2)
ALP SERPL-CCNC: 99 U/L (ref 45–117)
ALT SERPL-CCNC: 23 U/L (ref 12–78)
ANION GAP SERPL CALC-SCNC: 7 MMOL/L (ref 2–12)
APPEARANCE UR: ABNORMAL
AST SERPL W P-5'-P-CCNC: ABNORMAL U/L (ref 15–37)
BACTERIA URNS QL MICRO: NEGATIVE /HPF
BASOPHILS # BLD: 0.05 K/UL (ref 0–0.1)
BASOPHILS NFR BLD: 0.4 % (ref 0–1)
BILIRUB SERPL-MCNC: 0.6 MG/DL (ref 0.2–1)
BILIRUB UR QL: NEGATIVE
BUN SERPL-MCNC: 10 MG/DL (ref 6–20)
BUN/CREAT SERPL: 10 (ref 12–20)
CA-I BLD-MCNC: 9.4 MG/DL (ref 8.5–10.1)
CHLORIDE SERPL-SCNC: 112 MMOL/L (ref 97–108)
CO2 SERPL-SCNC: 20 MMOL/L (ref 21–32)
COLOR UR: YELLOW
CREAT SERPL-MCNC: 0.96 MG/DL (ref 0.55–1.02)
DIFFERENTIAL METHOD BLD: ABNORMAL
EOSINOPHIL # BLD: 0.03 K/UL (ref 0–0.4)
EOSINOPHIL NFR BLD: 0.2 % (ref 0–7)
EPITH CASTS URNS QL MICRO: ABNORMAL /LPF
ERYTHROCYTE [DISTWIDTH] IN BLOOD BY AUTOMATED COUNT: 14 % (ref 11.5–14.5)
GLOBULIN SER CALC-MCNC: 4.3 G/DL (ref 2–4)
GLUCOSE SERPL-MCNC: 93 MG/DL (ref 65–100)
GLUCOSE UR STRIP.AUTO-MCNC: NEGATIVE MG/DL
HCT VFR BLD AUTO: 46.8 % (ref 35–47)
HGB BLD-MCNC: 15.3 G/DL (ref 11.5–16)
HGB UR QL STRIP: NEGATIVE
IMM GRANULOCYTES # BLD AUTO: 0.07 K/UL (ref 0–0.04)
IMM GRANULOCYTES NFR BLD AUTO: 0.5 % (ref 0–0.5)
KETONES UR QL STRIP.AUTO: 80 MG/DL
LEUKOCYTE ESTERASE UR QL STRIP.AUTO: NEGATIVE
LIPASE SERPL-CCNC: 31 U/L (ref 13–75)
LYMPHOCYTES # BLD: 2.14 K/UL (ref 0.8–3.5)
LYMPHOCYTES NFR BLD: 15.2 % (ref 12–49)
MAGNESIUM SERPL-MCNC: NORMAL MG/DL (ref 1.6–2.4)
MCH RBC QN AUTO: 26.6 PG (ref 26–34)
MCHC RBC AUTO-ENTMCNC: 32.7 G/DL (ref 30–36.5)
MCV RBC AUTO: 81.3 FL (ref 80–99)
MONOCYTES # BLD: 0.68 K/UL (ref 0–1)
MONOCYTES NFR BLD: 4.8 % (ref 5–13)
MUCOUS THREADS URNS QL MICRO: ABNORMAL /LPF
NEUTS SEG # BLD: 11.08 K/UL (ref 1.8–8)
NEUTS SEG NFR BLD: 78.9 % (ref 32–75)
NITRITE UR QL STRIP.AUTO: NEGATIVE
NRBC # BLD: 0 K/UL (ref 0–0.01)
NRBC BLD-RTO: 0 PER 100 WBC
PH UR STRIP: 5 (ref 5–8)
PLATELET # BLD AUTO: 263 K/UL (ref 150–400)
PMV BLD AUTO: 11.3 FL (ref 8.9–12.9)
POTASSIUM SERPL-SCNC: ABNORMAL MMOL/L (ref 3.5–5.1)
PROT SERPL-MCNC: 8.3 G/DL (ref 6.4–8.2)
PROT UR STRIP-MCNC: 30 MG/DL
RBC # BLD AUTO: 5.76 M/UL (ref 3.8–5.2)
RBC #/AREA URNS HPF: ABNORMAL /HPF (ref 0–5)
SODIUM SERPL-SCNC: 139 MMOL/L (ref 136–145)
SP GR UR REFRACTOMETRY: >1.03 (ref 1–1.03)
URINE CULTURE IF INDICATED: ABNORMAL
UROBILINOGEN UR QL STRIP.AUTO: 2 EU/DL (ref 0.1–1)
WBC # BLD AUTO: 14.1 K/UL (ref 3.6–11)
WBC URNS QL MICRO: ABNORMAL /HPF (ref 0–4)

## 2025-07-19 PROCEDURE — 74177 CT ABD & PELVIS W/CONTRAST: CPT

## 2025-07-19 PROCEDURE — 6360000002 HC RX W HCPCS: Performed by: EMERGENCY MEDICINE

## 2025-07-19 PROCEDURE — 96376 TX/PRO/DX INJ SAME DRUG ADON: CPT

## 2025-07-19 PROCEDURE — 96361 HYDRATE IV INFUSION ADD-ON: CPT

## 2025-07-19 PROCEDURE — 81001 URINALYSIS AUTO W/SCOPE: CPT

## 2025-07-19 PROCEDURE — 2580000003 HC RX 258: Performed by: EMERGENCY MEDICINE

## 2025-07-19 PROCEDURE — 80053 COMPREHEN METABOLIC PANEL: CPT

## 2025-07-19 PROCEDURE — 96375 TX/PRO/DX INJ NEW DRUG ADDON: CPT

## 2025-07-19 PROCEDURE — 6360000004 HC RX CONTRAST MEDICATION: Performed by: EMERGENCY MEDICINE

## 2025-07-19 PROCEDURE — 96374 THER/PROPH/DIAG INJ IV PUSH: CPT

## 2025-07-19 PROCEDURE — 85025 COMPLETE CBC W/AUTO DIFF WBC: CPT

## 2025-07-19 PROCEDURE — 83735 ASSAY OF MAGNESIUM: CPT

## 2025-07-19 PROCEDURE — 36415 COLL VENOUS BLD VENIPUNCTURE: CPT

## 2025-07-19 PROCEDURE — 83690 ASSAY OF LIPASE: CPT

## 2025-07-19 PROCEDURE — 99285 EMERGENCY DEPT VISIT HI MDM: CPT

## 2025-07-19 RX ORDER — IOPAMIDOL 755 MG/ML
100 INJECTION, SOLUTION INTRAVASCULAR
Status: COMPLETED | OUTPATIENT
Start: 2025-07-19 | End: 2025-07-19

## 2025-07-19 RX ORDER — 0.9 % SODIUM CHLORIDE 0.9 %
1000 INTRAVENOUS SOLUTION INTRAVENOUS ONCE
Status: COMPLETED | OUTPATIENT
Start: 2025-07-19 | End: 2025-07-19

## 2025-07-19 RX ORDER — METOCLOPRAMIDE HYDROCHLORIDE 5 MG/ML
10 INJECTION INTRAMUSCULAR; INTRAVENOUS ONCE
Status: COMPLETED | OUTPATIENT
Start: 2025-07-19 | End: 2025-07-19

## 2025-07-19 RX ORDER — HYDROMORPHONE HYDROCHLORIDE 1 MG/ML
1 INJECTION, SOLUTION INTRAMUSCULAR; INTRAVENOUS; SUBCUTANEOUS
Refills: 0 | Status: COMPLETED | OUTPATIENT
Start: 2025-07-19 | End: 2025-07-19

## 2025-07-19 RX ORDER — MORPHINE SULFATE 4 MG/ML
4 INJECTION, SOLUTION INTRAMUSCULAR; INTRAVENOUS
Refills: 0 | Status: COMPLETED | OUTPATIENT
Start: 2025-07-19 | End: 2025-07-19

## 2025-07-19 RX ORDER — ONDANSETRON 2 MG/ML
4 INJECTION INTRAMUSCULAR; INTRAVENOUS ONCE
Status: COMPLETED | OUTPATIENT
Start: 2025-07-19 | End: 2025-07-19

## 2025-07-19 RX ORDER — ONDANSETRON 4 MG/1
4 TABLET, ORALLY DISINTEGRATING ORAL 3 TIMES DAILY PRN
Qty: 21 TABLET | Refills: 0 | Status: SHIPPED | OUTPATIENT
Start: 2025-07-19

## 2025-07-19 RX ORDER — OXYCODONE AND ACETAMINOPHEN 5; 325 MG/1; MG/1
1 TABLET ORAL EVERY 6 HOURS PRN
Qty: 12 TABLET | Refills: 0 | Status: SHIPPED | OUTPATIENT
Start: 2025-07-19 | End: 2025-07-22

## 2025-07-19 RX ADMIN — SODIUM CHLORIDE 1000 ML: 0.9 INJECTION, SOLUTION INTRAVENOUS at 17:46

## 2025-07-19 RX ADMIN — ONDANSETRON 4 MG: 2 INJECTION, SOLUTION INTRAMUSCULAR; INTRAVENOUS at 20:58

## 2025-07-19 RX ADMIN — ONDANSETRON 4 MG: 2 INJECTION, SOLUTION INTRAMUSCULAR; INTRAVENOUS at 17:47

## 2025-07-19 RX ADMIN — MORPHINE SULFATE 4 MG: 4 INJECTION, SOLUTION INTRAMUSCULAR; INTRAVENOUS at 17:47

## 2025-07-19 RX ADMIN — HYDROMORPHONE HYDROCHLORIDE 1 MG: 1 INJECTION, SOLUTION INTRAMUSCULAR; INTRAVENOUS; SUBCUTANEOUS at 20:00

## 2025-07-19 RX ADMIN — IOPAMIDOL 100 ML: 755 INJECTION, SOLUTION INTRAVENOUS at 20:35

## 2025-07-19 RX ADMIN — METOCLOPRAMIDE 10 MG: 5 INJECTION, SOLUTION INTRAMUSCULAR; INTRAVENOUS at 21:36

## 2025-07-19 RX ADMIN — SODIUM CHLORIDE 1000 ML: 0.9 INJECTION, SOLUTION INTRAVENOUS at 20:04

## 2025-07-19 ASSESSMENT — PAIN DESCRIPTION - DESCRIPTORS: DESCRIPTORS: ACHING

## 2025-07-19 ASSESSMENT — PAIN SCALES - GENERAL
PAINLEVEL_OUTOF10: 10
PAINLEVEL_OUTOF10: 6
PAINLEVEL_OUTOF10: 10

## 2025-07-19 ASSESSMENT — PAIN DESCRIPTION - LOCATION
LOCATION: ABDOMEN
LOCATION: ABDOMEN

## 2025-07-19 ASSESSMENT — LIFESTYLE VARIABLES
HOW MANY STANDARD DRINKS CONTAINING ALCOHOL DO YOU HAVE ON A TYPICAL DAY: PATIENT DECLINED
HOW OFTEN DO YOU HAVE A DRINK CONTAINING ALCOHOL: PATIENT DECLINED

## 2025-07-19 NOTE — ED TRIAGE NOTES
Reports diarrhea x 3 days as well as 4 episodes of vomiting. States that she has been taking Macrobid d/t recurrent UTIs. [facility]  Denies fever , endorses d/n/v/chills    Patient alert and oriented and ambulatory to ER.

## 2025-07-19 NOTE — ED PROVIDER NOTES
[facility] EMERGENCY DEPT  EMERGENCY DEPARTMENT HISTORY AND PHYSICAL EXAM      Date of evaluation: 7/19/2025  Patient Name: Ed Ledezma  Birthdate 1990  MRN: [id number]  ED Provider: Leonor Martinez MD   Note Started: 5:30 PM EDT 7/19/25    HISTORY OF PRESENT ILLNESS     Chief Complaint   Patient presents with    Diarrhea       History Provided By: Patient, EMS     HPI: Ed Ledezma is a 34 y.o. female patient presents with crampy abdominal pain nausea vomiting diarrhea.  Pain in the right lower quadrant.    PAST MEDICAL HISTORY   Past Medical History:  Past Medical History:   Diagnosis Date    ADHD     Asthma     Bipolar disorder (HCC)     Chronic obstructive pulmonary disease (HCC)     Depression     PTSD (post-traumatic stress disorder)     Sleep apnea        Past Surgical History:  Past Surgical History:   Procedure Laterality Date    COLONOSCOPY N/A 12/9/2021    COLONOSCOPY WITH POSSIBLE BIPSY AND UPPER ENDOSCOPY performed by Mart Garces MD at [facility] ENDOSCOPY    COLONOSCOPY N/A 1/5/2024    COLONOSCOPY, EGD performed by Mart Garces MD at [facility] ENDOSCOPY    EP DEVICE PROCEDURE N/A 7/24/2023    Loop recorder insert performed by Hakeem Xavier MD at [facility] CARDIAC CATH LAB    PELVIC LAPAROSCOPY      Endometriosis    TONSILLECTOMY      UPPER GASTROINTESTINAL ENDOSCOPY N/A 1/5/2024    EGD ESOPHAGOGASTRODUODENOSCOPY WITH 48 HOUR PH PROBE PLACEMENT performed by Mart Garces MD at [facility] ENDOSCOPY       Family History:  Family History   Problem Relation Age of Onset    Cancer Mother     Heart Disease Father        Social History:  Social History     Tobacco Use    Smoking status: Former     Current packs/day: 0.00     Types: Cigarettes     Quit date: 1/31/2022     Years since quitting: 3.4    Smokeless tobacco: Never   Vaping Use    Vaping status: Never Used   Substance Use Topics    Alcohol use: Not Currently    Drug use: Never       Allergies:  Allergies   Allergen Reactions    Latex Hives    Meperidine Itching and  Effort: Pulmonary effort is normal. No respiratory distress.      Breath sounds: Normal breath sounds.   Abdominal:      General: Abdomen is flat. There is no distension.      Tenderness: There is abdominal tenderness.      Comments: Rlq pain guarding   Musculoskeletal:         General: No swelling or deformity. Normal range of motion.      Cervical back: Normal range of motion.   Skin:     General: Skin is warm.      Coloration: Skin is not jaundiced or pale.   Neurological:      General: No focal deficit present.      Mental Status: She is alert and oriented to person, place, and time. Mental status is at baseline.   Psychiatric:         Mood and Affect: Mood normal.         Behavior: Behavior normal.         Thought Content: Thought content normal.         Judgment: Judgment normal.         SCREENINGS                No data recorded       LAB, EKG AND DIAGNOSTIC RESULTS   Labs:  Recent Results (from the past 12 hours)   CBC with Auto Differential    Collection Time: 07/19/25  5:41 PM   Result Value Ref Range    WBC 14.1 (H) 3.6 - 11.0 K/uL    RBC 5.76 (H) 3.80 - 5.20 M/uL    Hemoglobin 15.3 11.5 - 16.0 g/dL    Hematocrit 46.8 35.0 - 47.0 %    MCV 81.3 80.0 - 99.0 FL    MCH 26.6 26.0 - 34.0 PG    MCHC 32.7 30.0 - 36.5 g/dL    RDW 14.0 11.5 - 14.5 %    Platelets 263 150 - 400 K/uL    MPV 11.3 8.9 - 12.9 FL    Nucleated RBCs 0.0 0.0  WBC    nRBC 0.00 0.00 - 0.01 K/uL    Neutrophils % 78.9 (H) 32.0 - 75.0 %    Lymphocytes % 15.2 12.0 - 49.0 %    Monocytes % 4.8 (L) 5.0 - 13.0 %    Eosinophils % 0.2 0.0 - 7.0 %    Basophils % 0.4 0.0 - 1.0 %    Immature Granulocytes % 0.5 0 - 0.5 %    Neutrophils Absolute 11.08 (H) 1.80 - 8.00 K/UL    Lymphocytes Absolute 2.14 0.80 - 3.50 K/UL    Monocytes Absolute 0.68 0.00 - 1.00 K/UL    Eosinophils Absolute 0.03 0.00 - 0.40 K/UL    Basophils Absolute 0.05 0.00 - 0.10 K/UL    Immature Granulocytes Absolute 0.07 (H) 0.00 - 0.04 K/UL    Differential Type AUTOMATED

## 2025-07-20 NOTE — DISCHARGE INSTRUCTIONS
through use of a standardized protocol tailored for this examination and automatic exposure control for dose modulation. FINDINGS: LOWER THORAX: No significant abnormality in the incidentally imaged lower chest. LIVER: No mass. BILIARY TREE: Gallbladder is within normal limits. CBD is not dilated. SPLEEN: within normal limits. PANCREAS: No mass or ductal dilatation. ADRENALS: Unremarkable. KIDNEYS: No mass, calculus, or hydronephrosis. Small left renal cyst. STOMACH: Unremarkable. SMALL BOWEL: No dilatation or wall thickening. COLON: No dilatation or wall thickening. APPENDIX: Unremarkable. PERITONEUM: No ascites or pneumoperitoneum. RETROPERITONEUM: No lymphadenopathy or aortic aneurysm. REPRODUCTIVE ORGANS: Unremarkable. URINARY BLADDER: Not distended. BONES: No destructive bone lesion. ABDOMINAL WALL: No mass or hernia. ADDITIONAL COMMENTS: N/A     Normal appendix. No acute abnormality. Electronically signed by LILIAN PHELAN    ------------------------------------------------------------------------------------------------------------  The evaluation and treatment you received in the Emergency Department were for an urgent problem. It is important that you follow-up with a doctor, nurse practitioner, or physician assistant to:  (1) confirm your diagnosis,  (2) re-evaluation of changes in your illness and treatment, and (3) for ongoing care. Please take your discharge instructions with you when you go to your follow-up appointment.     If you have any problem arranging a follow-up appointment, contact us!  If your symptoms become worse or you do not improve as expected, please return to us. We are available 24 hours a day.     If a prescription has been provided, please fill it as soon as possible to prevent a delay in treatment. If you have any questions or reservations about taking the medication due to side effects or interactions with other medications, please call your primary care provider or contact us

## (undated) DEVICE — SHEET,DRAPE,40X58,STERILE: Brand: MEDLINE

## (undated) DEVICE — MASK ANES INF SZ 2 PREM TAIL VLV INFL PRT UNSCENTED SGL PT

## (undated) DEVICE — GENERAL PURPOSE TRAY STERILE: Brand: MEDLINE INDUSTRIES, INC.

## (undated) DEVICE — Device: Brand: JELCO

## (undated) DEVICE — MOUTHPIECE ENDOSCP 20X27MM --

## (undated) DEVICE — GAUZE,SPONGE,4"X4",16PLY,STRL,LF,10/TRAY: Brand: MEDLINE

## (undated) DEVICE — COVER,MAYO STAND,STERILE: Brand: MEDLINE

## (undated) DEVICE — TOWEL,OR,DSP,ST,BLUE,DLX,6/PK,12PK/CS: Brand: MEDLINE

## (undated) DEVICE — ENDO KIT 1: Brand: MEDLINE INDUSTRIES, INC.

## (undated) DEVICE — FORCEPS BX L240CM JAW DIA2.8MM L CAP W/ NDL MIC MESH TOOTH

## (undated) DEVICE — MASK O2 MED AD 7 FT 3 IN 1 W/ STD CONN LTX

## (undated) DEVICE — CONTAINER,SPECIMEN,PNEU TUBE,4OZ,OR STRL: Brand: MEDLINE

## (undated) DEVICE — 3M™ STERI-STRIP™ REINFORCED ADHESIVE SKIN CLOSURES, R1542, 1/4 IN X 1-1/2 IN (6 MM X 38 MM), 6 STRIPS/ENVELOPE: Brand: 3M™ STERI-STRIP™

## (undated) DEVICE — GOWN,PREVENTION PLUS,L,ST,24/CS: Brand: MEDLINE

## (undated) DEVICE — CANNULA NSL O2 AD 7 FT END-TIDAL CARBON DIOX VENTFLO

## (undated) DEVICE — BRAVO CF CAPSULE  DELIVERY DEV, 5-PK: Brand: BRAVO

## (undated) DEVICE — DRESSING FOAM W4XL4IN SIL FACE BORD ADH PD SUP ABSRB COR